# Patient Record
Sex: FEMALE | Race: WHITE | NOT HISPANIC OR LATINO | Employment: FULL TIME | URBAN - METROPOLITAN AREA
[De-identification: names, ages, dates, MRNs, and addresses within clinical notes are randomized per-mention and may not be internally consistent; named-entity substitution may affect disease eponyms.]

---

## 2023-07-27 LAB
ABO GROUP (TYPE) IN BLOOD: NORMAL
ANTIBODY SCREEN: NORMAL
ERYTHROCYTE DISTRIBUTION WIDTH (RATIO) BY AUTOMATED COUNT: 11.7 % (ref 11.5–14.5)
ERYTHROCYTE MEAN CORPUSCULAR HEMOGLOBIN CONCENTRATION (G/DL) BY AUTOMATED: 34.1 G/DL (ref 32–36)
ERYTHROCYTE MEAN CORPUSCULAR VOLUME (FL) BY AUTOMATED COUNT: 91 FL (ref 80–100)
ERYTHROCYTES (10*6/UL) IN BLOOD BY AUTOMATED COUNT: 4.11 X10E12/L (ref 4–5.2)
HEMATOCRIT (%) IN BLOOD BY AUTOMATED COUNT: 37.5 % (ref 36–46)
HEMOGLOBIN (G/DL) IN BLOOD: 12.8 G/DL (ref 12–16)
HEPATITIS B VIRUS SURFACE AG PRESENCE IN SERUM: NONREACTIVE
HEPATITIS C VIRUS AB PRESENCE IN SERUM: NONREACTIVE
HIV 1/ 2 AG/AB SCREEN: NONREACTIVE
LEUKOCYTES (10*3/UL) IN BLOOD BY AUTOMATED COUNT: 8.2 X10E9/L (ref 4.4–11.3)
NRBC (PER 100 WBCS) BY AUTOMATED COUNT: 0 /100 WBC (ref 0–0)
PLATELETS (10*3/UL) IN BLOOD AUTOMATED COUNT: 199 X10E9/L (ref 150–450)
REFLEX ADDED, ANEMIA PANEL: NORMAL
RH FACTOR: NORMAL
RUBELLA VIRUS IGG AB: POSITIVE
SYPHILIS TOTAL AB: NONREACTIVE

## 2023-08-07 ENCOUNTER — TELEPHONE (OUTPATIENT)
Dept: PRIMARY CARE | Facility: CLINIC | Age: 33
End: 2023-08-07
Payer: COMMERCIAL

## 2023-08-17 DIAGNOSIS — T78.40XA ALLERGY, INITIAL ENCOUNTER: ICD-10-CM

## 2023-08-19 LAB
CHLAMYDIA TRACH., AMPLIFIED: NEGATIVE
N. GONORRHEA, AMPLIFIED: NEGATIVE
URINE CULTURE: NORMAL

## 2023-09-29 VITALS — DIASTOLIC BLOOD PRESSURE: 60 MMHG | BODY MASS INDEX: 23.82 KG/M2 | SYSTOLIC BLOOD PRESSURE: 106 MMHG | WEIGHT: 159 LBS

## 2023-09-29 PROBLEM — R00.2 PALPITATIONS: Status: ACTIVE | Noted: 2023-09-29

## 2023-09-29 PROBLEM — N89.8 VAGINAL ODOR: Status: ACTIVE | Noted: 2023-09-29

## 2023-09-29 PROBLEM — B35.1 ONYCHOMYCOSIS OF TOENAIL: Status: ACTIVE | Noted: 2023-09-29

## 2023-09-29 PROBLEM — R92.8 ABNORMAL MAMMOGRAM: Status: ACTIVE | Noted: 2023-09-29

## 2023-09-29 PROBLEM — R42 DIZZINESS: Status: ACTIVE | Noted: 2023-09-29

## 2023-09-29 RX ORDER — EPINEPHRINE 0.3 MG/.3ML
INJECTION SUBCUTANEOUS
COMMUNITY
Start: 2022-09-08

## 2023-09-29 RX ORDER — CALCIUM CARBONATE/VITAMIN D3 500 MG-10
1 TABLET,CHEWABLE ORAL DAILY
COMMUNITY

## 2023-10-10 NOTE — PROGRESS NOTES
Rosemary Tellez female   1990 33 y.o.   [id number]      Chief Complaint  High risk surveillance care, annual mammogram and exam    History Of Present Illness  Rosemary Tellez is a very pleasant 33 year old  female [facility] care. She has family history of breast cancer in her mother, age 58, BRCA negative and three maternal aunts, ages 43, 64, BRCA negative and 62. She denies breast surgery or biopsy. She presents today for annual mammogram and exam. She denies any new masses or lumps. She is currently 20 weeks pregnant with a baby boy. She is due 3/4/2023.     BREAST IMAGING: 3/1/2023 Bilateral Fast MRI, indicates BI-RADS Category 1. 10/10/2022 Bilateral screening mammogram, indicates BI-RADS Category 1.     FEMALE HISTORY: menarche age 10, , first birth age 28,  x 19 months, OCP's x 12 years, premenopausal, 20 weeks pregnant, extremely dense tissue    FAMILY CANCER HISTORY:  Mother: Breast cancer, 50s, BRCA negative  Maternal Aunt (1): Breast cancer, 43  Maternal Aunt (2): Breast cancer, 64, BRCA negative  Maternal Aunt (3): Breast cancer, 62  Paternal Uncle: Melanoma, 30s  Paternal Great Grandmother: Pancreatic cancer, 80s      Surgical History  She has a past surgical history that includes Mouth surgery (2014) and Other surgical history (2017).     Social History  She reports that she has never smoked. She has never used smokeless tobacco. She reports that she does not currently use alcohol. She reports that she does not use drugs.    Family History  Family History   Problem Relation Name Age of Onset    Other (BICUSPID AORTIC VALVE) Mother      Breast cancer Mother      Other (THORACIC AORTIC ANEURYSM) Mother      Heart attack Father      Breast cancer Mother's Sister      Osteoporosis Maternal Grandmother      Diabetes Paternal Grandfather      Heart attack Paternal Grandfather          Allergies  Patient has no known  allergies.    Medications  Current Outpatient Medications   Medication Instructions    calcium carbonate-vitamin D3 (Calcium 500 + D) 500 mg-10 mcg (400 unit) chewable tablet 1 tablet, oral, Daily    EPINEPHrine 0.3 mg/0.3 mL injection syringe EPINEPHrine 0.3 MG/0.3ML Injection Solution Auto-injector   Quantity: 2  Refills: 0        Start : 8-Sep-2022   Active    prenatal no.139-iron-folic-dha 33 mg iron- 800 mcg-350 mg combo pack oral         REVIEW OF SYSTEMS    Constitutional:  Negative for appetite change, fatigue, fever and unexpected weight change.   HENT:  Negative for ear pain, hearing loss, nosebleeds, sore throat and trouble swallowing.    Eyes:  Negative for discharge, itching and visual disturbance.   Respiratory:  Negative for cough, chest tightness and shortness of breath.    Cardiovascular:  Negative for chest pain, palpitations and leg swelling.   Breast: as indicated in HPI  Gastrointestinal:  Negative for abdominal pain, constipation, diarrhea and nausea.   Endocrine: Negative for cold intolerance and heat intolerance.   Genitourinary:  Negative for dysuria, frequency, hematuria, pelvic pain and vaginal bleeding.   Musculoskeletal:  Negative for arthralgias, back pain, gait problem, joint swelling and myalgias.   Skin:  Negative for color change and rash.   Allergic/Immunologic: Negative for environmental allergies and food allergies.   Neurological:  Negative for dizziness, tremors, speech difficulty, weakness, numbness and headaches.   Hematological:  Does not bruise/bleed easily.   Psychiatric/Behavioral:  Negative for agitation, dysphoric mood and sleep disturbance. The patient is not nervous/anxious.         Past Medical History  She has a past medical history of ASCUS with positive high risk HPV cervical, Migraine, Other conditions influencing health status, Other conditions influencing health status (01/16/2015), and Personal history of other diseases of the nervous system and sense  organs.     Physical Exam  Patient is alert and oriented x3 and in a relaxed and appropriate mood. Her gait is steady and hand grasps are equal. Sclera is clear. The breasts are full and nearly symmetrical. The tissue is very dense without palpable abnormalities, discrete nodules or masses. The skin and nipples appear normal. There is no cervical, supraclavicular or axillary lymphadenopathy. I am able to palpate a right axillary lymph node, soft, smooth and mobile. Heart rate and rhythm normal, S1 and S2 appreciated. The lungs are clear to auscultation bilaterally. Abdomen is soft and non-tender.       Physical Exam     Last Recorded Vitals  There were no vitals filed for this visit.    Relevant Results   Time was spent discussing digital images of the radiology testing with the patient. I explained the results in depth, along with suggested explanation for follow up recommendations based on the testing results. BI-RADS Category 1    Imaging  MR breast bilateral w IV contrast fast screening self pay     Narrative  Interpreted By:  SLADE PYLE MD  MRN: [id number]  Patient Name: DAMARIS CARDOSO    STUDY:  MRI BREAST BILATERAL WITH CONTRAST FAST SCREENING (SELF PAY);  3/1/2023 7:49 am    ACCESSION NUMBER(S):  [id number]    ORDERING CLINICIAN:  MARY SWANN    INDICATION:  Dense breast tissue on mammography. Patient has a family history of  breast cancer in her mother in her 50s and maternal aunt in her 60s.    COMPARISON:  04/10/2022, mammogram from 10/10/2022    TECHNIQUE:  Using a dedicated breast coil, STIR axial and T1-weighted fat  saturation axial images of the breasts were obtained, the latter both  before and after intravenous administration of Gadolinium DTPA.    Intravenous contrast:  14 milliliter of DOTAREM GADOTERATE MEGLUMINE  INJECTION    FINDINGS:  There is symmetric minimal bilateral background enhancement.    There is extreme fibroglandular tissue.    RIGHT BREAST:  No suspicious mass or nonmass  enhancement is  identified.    No axillary or internal mammary lymphadenopathy is appreciated.    LEFT BREAST:  No suspicious mass or nonmass enhancement is identified.    No axillary or internal mammary lymphadenopathy is appreciated.    NON-BREAST FINDINGS:  None.    Impression  No MRI evidence of malignancy in either breast.    BI-RADS CATEGORY:    Category: 1 - Negative.  Recommendation:  1 Year Screening.       Assessment/Plan   High risk surveillance care, normal clinical exam, family history of breast cancer, family history of BRCA negative, extremely dense tissue    Plan: Discussed delaying mammogram until after delivery versus mammogram today. Reassured patient mammogram is safe during pregnancy. Patient would like to have OB ultrasound today as scheduled and return for screening mammogram to follow. Will call if results are abnormal. She is aware to pump and nurse both breasts prior to annual mammogram next year.     Patient Discussion/Summary  Your clinical examination is normal. Please return in one year for bilateral screening mammogram and office visit or sooner if you have any problems or concerns.     High risk breast surveillance care plan:  Yearly mammogram with digital breast tomosynthesis  Twice yearly clinical breast examinations  Breast MRI - not recommended at this time due to pregnancy  Monthly self breast examinations  Vitamin D3 2000 IU/daily (over the counter)  Exercise 3-4 times per week for 45-60 minutes  Limit alcohol to 3-4 drinks per week   Eat a healthy low-fat diet with lots of fruits and vegetables  Risk models indicate personal risk of breast cancer in the next five years and lifetime (age 90)  Breast Cancer Risk Assessment Tool (Starla): 5 year risk 0.6% (average 0.3%) and lifetime risk 19.7% (average 12.6%)  Hermelindo: 5 year risk 1.1% (average 0.3%) and lifetime risk 32.1% (average 11.1%)     You can see your health information, review clinical summaries from office visits &  test results online when you follow your health with MY  Chart, a personal health record. To sign up go to [email or web address]. If you need assistance with signing up or trouble getting into your account call [facility] Patient Line 24/7 at [phone].    My office phone number is [phone] if you need to get in touch with me or have additional questions or concerns. Thank you for choosing [facility] and trusting me as your healthcare provider. I look forward to seeing you again at your next office visit. I am honored to be a provider on your health care team and I remain dedicated to helping you achieve your health goals.      Joann Hebert, APRN-CNP

## 2023-10-16 ENCOUNTER — ANCILLARY PROCEDURE (OUTPATIENT)
Dept: RADIOLOGY | Facility: CLINIC | Age: 33
End: 2023-10-16
Payer: COMMERCIAL

## 2023-10-16 ENCOUNTER — APPOINTMENT (OUTPATIENT)
Dept: RADIOLOGY | Facility: CLINIC | Age: 33
End: 2023-10-16
Payer: COMMERCIAL

## 2023-10-16 ENCOUNTER — OFFICE VISIT (OUTPATIENT)
Dept: SURGICAL ONCOLOGY | Facility: CLINIC | Age: 33
End: 2023-10-16
Payer: COMMERCIAL

## 2023-10-16 VITALS
BODY MASS INDEX: 25.21 KG/M2 | OXYGEN SATURATION: 99 % | HEART RATE: 77 BPM | WEIGHT: 167.11 LBS | SYSTOLIC BLOOD PRESSURE: 106 MMHG | DIASTOLIC BLOOD PRESSURE: 71 MMHG | RESPIRATION RATE: 18 BRPM | TEMPERATURE: 97.5 F

## 2023-10-16 VITALS — WEIGHT: 158.95 LBS | BODY MASS INDEX: 24.09 KG/M2 | HEIGHT: 68 IN

## 2023-10-16 DIAGNOSIS — Z12.39 ENCOUNTER FOR OTHER SCREENING FOR MALIGNANT NEOPLASM OF BREAST: ICD-10-CM

## 2023-10-16 DIAGNOSIS — R92.30 DENSE BREAST TISSUE: ICD-10-CM

## 2023-10-16 DIAGNOSIS — Z12.39 BREAST CANCER SCREENING, HIGH RISK PATIENT: Primary | ICD-10-CM

## 2023-10-16 DIAGNOSIS — Z3A.19 19 WEEKS GESTATION OF PREGNANCY (HHS-HCC): ICD-10-CM

## 2023-10-16 DIAGNOSIS — O35.EXX0 FETAL RENAL ANOMALY, SINGLE GESTATION (HHS-HCC): Primary | ICD-10-CM

## 2023-10-16 PROCEDURE — 77067 SCR MAMMO BI INCL CAD: CPT | Mod: BILATERAL PROCEDURE | Performed by: STUDENT IN AN ORGANIZED HEALTH CARE EDUCATION/TRAINING PROGRAM

## 2023-10-16 PROCEDURE — 99214 OFFICE O/P EST MOD 30 MIN: CPT | Performed by: NURSE PRACTITIONER

## 2023-10-16 PROCEDURE — 77063 BREAST TOMOSYNTHESIS BI: CPT | Mod: BILATERAL PROCEDURE | Performed by: STUDENT IN AN ORGANIZED HEALTH CARE EDUCATION/TRAINING PROGRAM

## 2023-10-16 PROCEDURE — 77067 SCR MAMMO BI INCL CAD: CPT | Mod: 50

## 2023-10-16 PROCEDURE — 76811 OB US DETAILED SNGL FETUS: CPT | Performed by: OBSTETRICS & GYNECOLOGY

## 2023-10-16 PROCEDURE — 1036F TOBACCO NON-USER: CPT | Performed by: NURSE PRACTITIONER

## 2023-10-16 PROCEDURE — 77063 BREAST TOMOSYNTHESIS BI: CPT | Mod: 50

## 2023-10-16 PROCEDURE — 76811 OB US DETAILED SNGL FETUS: CPT

## 2023-10-16 ASSESSMENT — PAIN SCALES - GENERAL: PAINLEVEL: 0-NO PAIN

## 2023-10-16 ASSESSMENT — ENCOUNTER SYMPTOMS
OCCASIONAL FEELINGS OF UNSTEADINESS: 0
LOSS OF SENSATION IN FEET: 0
DEPRESSION: 0

## 2023-10-16 NOTE — PROGRESS NOTES
Ultrasound findings:  AMA 34 with rr cfDNA.  A targeted anatomic survey was indicated due to the finding of pyelectasis  -Fetal biometry is consistent with the stated gestational age  - Isolated renal pyelectasis is noted with an APRD of 4.4 m bilaterally.  There is no ureter visible, the parenchyma appears normal and there is no caliectasis. No other soft markers for fetal trisomy seen.  -Detailed anatomic evaluation of the fetal brain/ventricles, face, heart/outflow tracts and chest anatomy, abdominal organ specific anatomy, number/length/architecture of limbs and detailed evaluation of the umbilical cord and placenta and other fetal anatomy as clinically indicated was performed.   -No malformations were identified on this comprehensive survey within limitations of sonographic evaluation at this gestational age.  Isolated pyelectasis increases the risk for Trisomy 21 by approximately 2.8 fold.  Correlation with previous screening or maternal age revises risk to 3751. Genetic counseling is available if desired. The finding of renal pyelectasis also increases the risk for urinary reflux and and obstructive uropathy. A single follow up study at 34-36 weeks is recommended for renal evaluation. If pyelectasis persists or worsens,  evaluation is warranted.  An otherwise normal sonogram cannot exclude all structural and functional deficits.  The patient was informed of the above finding and all questions addressed. (see consultation note)  Thank you for the referral    Counseling:  Counseling was performed due to bilateral pyelectasis on the anatomic survey today.  There were no other abnormalities.  The following counseling was given:   - Measurements show a mild increase In the size if the renal pelvis: rt- 4.4mm, bilateral.  The tissue of the kidney appears normal.  Ureters were not seen. There e is no caliectasis. No other malformations were noted  - The anatomy, components and function of the components  were reviewed. and ultrasound findings explained.  - Mild hydronephrosis, or pyelectasis, is a common finding seen during ultrasound evaluation.  It referes to mild dilation of the fetal renal pelvis. It has been estimated that about 2-3% of fetuses studied by ultrasound have mild dilation of the renal pelvis.    -This finding is also more common in males than in females. This is a male fetus and the patient knows the sex.  - Extra fluid in the renal pelves can be caused from: maternal hormones, obstruction and reflux.  These abnormalities were explained.  - Kidneys can also have pyelectasis and function totally normal.  Function cannot be determined by an ultrasound evaluation.  - For the majority of cases (90%) with mild hydronephrosis the long-term prognosis is excellent with no need for any surgical intervention.  - One  follow-up ultrasound study at 34-36 weeks gestation is recommended. It should be performed at that time even if early third trimester scans are normal as it correlates better with outcome.  - If the kidneys appear normal, no follow up is needed. If pyelectasis is still present,  evaluation is recommended. Typically, there is a direct correlation of the degree of renal pelvis dilation and subsequent uropathy and the need for surgical intervention. The measurements in this case are mildly elevated and not expected to be clinically significant.   -The association of mild dilation of the renal pelves and Down syndrome was then discussed.  She was informed that Down syndrome is usually a sporadic event related to increasing maternal age which happens at the time of conception.  She had a rr cfDNA with a residual risk of 1/10,000.  This finding increases the risk to 3751.  The parents were informed.    The patient expressed understanding of the above. She was told to schedule a repeat evaluaton at 34 weeks.

## 2023-10-16 NOTE — PATIENT INSTRUCTIONS
Your clinical examination is normal. Per our discussion, please proceed to your OB ultrasound today as scheduled. Pending normal scan, please return for bilateral screening mammogram. I will call you if the results are abnormal.     You can see your health information, review clinical summaries from office visits & test results online when you follow your health with MY  Chart, a personal health record. To sign up go to [email or web address]. If you need assistance with signing up or trouble getting into your account call [facility] Patient Line 24/7 at [phone].    My office phone number is [phone] if you need to get in touch with me or have additional questions or concerns. Thank you for choosing [facility] and trusting me as your healthcare provider. I look forward to seeing you again at your next office visit. I am honored to be a provider on your health care team and I remain dedicated to helping you achieve your health goals.

## 2023-10-20 ENCOUNTER — ROUTINE PRENATAL (OUTPATIENT)
Dept: OBSTETRICS AND GYNECOLOGY | Facility: CLINIC | Age: 33
End: 2023-10-20
Payer: COMMERCIAL

## 2023-10-20 VITALS — DIASTOLIC BLOOD PRESSURE: 68 MMHG | WEIGHT: 168 LBS | BODY MASS INDEX: 25.34 KG/M2 | SYSTOLIC BLOOD PRESSURE: 112 MMHG

## 2023-10-20 DIAGNOSIS — Z3A.20 20 WEEKS GESTATION OF PREGNANCY (HHS-HCC): Primary | ICD-10-CM

## 2023-10-20 LAB
POC BLOOD, URINE: NEGATIVE
POC GLUCOSE, URINE: NEGATIVE MG/DL
POC LEUKOCYTES, URINE: NEGATIVE
POC NITRITE,URINE: NEGATIVE
POC PROTEIN, URINE: NEGATIVE MG/DL

## 2023-10-20 PROCEDURE — 0501F PRENATAL FLOW SHEET: CPT | Performed by: OBSTETRICS & GYNECOLOGY

## 2023-10-20 PROCEDURE — 81003 URINALYSIS AUTO W/O SCOPE: CPT | Performed by: OBSTETRICS & GYNECOLOGY

## 2023-10-20 NOTE — PROGRESS NOTES
"Subjective   Patient ID [id number]   Rosemary Tellez is a 33 y.o.  at 20w4d with a working estimated date of delivery of 3/4/2024, by Last Menstrual Period who presents for a routine prenatal visit.   She denies vaginal bleeding, leakage of fluid, decreased fetal movements, or contractions.  Now feeling the baby move regularly.   Has questions about Covid vaccine. Already had flu vaccine.  Also questions about anatomy ultrasound done on Monday that showed pyelectasis.    Objective   Physical Exam  Weight: 76.2 kg (168 lb)  Expected Total Weight Gain: 11.5 kg (25 lb)-16 kg (35 lb)   Pregravid BMI: 23.99  BP: 112/68  Fetal Heart Rate: 152 Fundal Height (cm): 20 cm    Prenatal Labs  Urine dip:  No results found for: \"KETONESU\", \"GLUCOSEUR\", \"LEUKOCYTESUR\"       Assessment/Plan   Diagnoses and all orders for this visit:  20 weeks gestation of pregnancy  Continue prenatal vitamin.  Ultrasound reviewed. Explained finding of pyelectasis and gave reassurances. She has a follow up scheduled for 34 weeks.  Discussed that she can and should get the Covid vaccine booster at any time.  Follow up in 4 weeks for a routine prenatal visit.  "

## 2023-11-17 ENCOUNTER — ROUTINE PRENATAL (OUTPATIENT)
Dept: OBSTETRICS AND GYNECOLOGY | Facility: CLINIC | Age: 33
End: 2023-11-17
Payer: COMMERCIAL

## 2023-11-17 VITALS — WEIGHT: 170 LBS | SYSTOLIC BLOOD PRESSURE: 102 MMHG | BODY MASS INDEX: 25.65 KG/M2 | DIASTOLIC BLOOD PRESSURE: 70 MMHG

## 2023-11-17 DIAGNOSIS — Z34.82 ENCOUNTER FOR SUPERVISION OF OTHER NORMAL PREGNANCY IN SECOND TRIMESTER (HHS-HCC): Primary | ICD-10-CM

## 2023-11-17 DIAGNOSIS — Z3A.24 24 WEEKS GESTATION OF PREGNANCY (HHS-HCC): ICD-10-CM

## 2023-11-17 LAB
POC BLOOD, URINE: NEGATIVE
POC GLUCOSE, URINE: NEGATIVE MG/DL
POC KETONES, URINE: NEGATIVE MG/DL
POC LEUKOCYTES, URINE: ABNORMAL
POC NITRITE,URINE: NEGATIVE
POC PROTEIN, URINE: NEGATIVE MG/DL

## 2023-11-17 PROCEDURE — 0501F PRENATAL FLOW SHEET: CPT | Performed by: OBSTETRICS & GYNECOLOGY

## 2023-11-17 PROCEDURE — 81003 URINALYSIS AUTO W/O SCOPE: CPT | Performed by: OBSTETRICS & GYNECOLOGY

## 2023-11-17 NOTE — PROGRESS NOTES
Subjective   Rosemary Tellez is a 33 y.o.  at 24w4d, presents for a routine prenatal visit.   Feels well. Good fetal movement.     Objective     /70   Wt 77.1 kg (170 lb)   LMP 2023   BMI 25.65 kg/m²     Plan  24 weeks  Recd flu vaccine 10/13/2023  Recd Covid boostervaccine 2023  RTO one month  Glucose and CBC requisitions given

## 2023-11-24 ENCOUNTER — LAB (OUTPATIENT)
Dept: LAB | Facility: LAB | Age: 33
End: 2023-11-24
Payer: COMMERCIAL

## 2023-11-24 DIAGNOSIS — Z3A.24 24 WEEKS GESTATION OF PREGNANCY (HHS-HCC): ICD-10-CM

## 2023-11-24 DIAGNOSIS — R73.09 GLUCOSE TOLERANCE TEST ABNORMAL: ICD-10-CM

## 2023-11-24 LAB
ERYTHROCYTE [DISTWIDTH] IN BLOOD BY AUTOMATED COUNT: 12 % (ref 11.5–14.5)
GLUCOSE 1H P 50 G GLC PO SERPL-MCNC: 157 MG/DL
HCT VFR BLD AUTO: 35.5 % (ref 36–46)
HGB BLD-MCNC: 11.6 G/DL (ref 12–16)
MCH RBC QN AUTO: 30.9 PG (ref 26–34)
MCHC RBC AUTO-ENTMCNC: 32.7 G/DL (ref 32–36)
MCV RBC AUTO: 95 FL (ref 80–100)
NRBC BLD-RTO: 0 /100 WBCS (ref 0–0)
PLATELET # BLD AUTO: 216 X10*3/UL (ref 150–450)
RBC # BLD AUTO: 3.75 X10*6/UL (ref 4–5.2)
WBC # BLD AUTO: 9.2 X10*3/UL (ref 4.4–11.3)

## 2023-11-24 PROCEDURE — 82947 ASSAY GLUCOSE BLOOD QUANT: CPT

## 2023-11-24 PROCEDURE — 36415 COLL VENOUS BLD VENIPUNCTURE: CPT

## 2023-11-24 PROCEDURE — 85027 COMPLETE CBC AUTOMATED: CPT

## 2023-11-25 LAB — REFLEX ADDED, ANEMIA PANEL: NORMAL

## 2023-11-27 ENCOUNTER — LAB (OUTPATIENT)
Dept: LAB | Facility: LAB | Age: 33
End: 2023-11-27
Payer: COMMERCIAL

## 2023-11-27 DIAGNOSIS — R73.09 GLUCOSE TOLERANCE TEST ABNORMAL: ICD-10-CM

## 2023-11-27 LAB
GLUCOSE 1H P 100 G GLC PO SERPL-MCNC: 147 MG/DL
GLUCOSE 2H P 100 G GLC PO SERPL-MCNC: 121 MG/DL
GLUCOSE 3H P 100 G GLC PO SERPL-MCNC: 104 MG/DL
GLUCOSE P FAST SERPL-MCNC: 72 MG/DL

## 2023-11-27 PROCEDURE — 82952 GTT-ADDED SAMPLES: CPT

## 2023-11-27 PROCEDURE — 82951 GLUCOSE TOLERANCE TEST (GTT): CPT

## 2023-11-27 PROCEDURE — 36415 COLL VENOUS BLD VENIPUNCTURE: CPT

## 2023-11-27 PROCEDURE — 82950 GLUCOSE TEST: CPT

## 2023-12-13 NOTE — PROGRESS NOTES
Subjective   Patient ID [id number]   Rosemary Tellez is a 33 y.o.  at 28w4d     Objective   Physical Exam               Lab Results   Component Value Date    HGB 11.6 (L) 2023    HCT 35.5 (L) 2023       Assessment/Plan     Follow up in 4 weeks for a routine prenatal visit.

## 2023-12-15 ENCOUNTER — APPOINTMENT (OUTPATIENT)
Dept: OBSTETRICS AND GYNECOLOGY | Facility: CLINIC | Age: 33
End: 2023-12-15
Payer: COMMERCIAL

## 2023-12-15 ENCOUNTER — ROUTINE PRENATAL (OUTPATIENT)
Dept: OBSTETRICS AND GYNECOLOGY | Facility: CLINIC | Age: 33
End: 2023-12-15
Payer: COMMERCIAL

## 2023-12-15 VITALS — WEIGHT: 178 LBS | DIASTOLIC BLOOD PRESSURE: 60 MMHG | SYSTOLIC BLOOD PRESSURE: 110 MMHG | BODY MASS INDEX: 26.85 KG/M2

## 2023-12-15 DIAGNOSIS — Z3A.28 28 WEEKS GESTATION OF PREGNANCY (HHS-HCC): ICD-10-CM

## 2023-12-15 LAB
POC BLOOD, URINE: NEGATIVE
POC GLUCOSE, URINE: NEGATIVE MG/DL
POC KETONES, URINE: NEGATIVE MG/DL
POC LEUKOCYTES, URINE: NEGATIVE
POC NITRITE,URINE: NEGATIVE
POC PROTEIN, URINE: NEGATIVE MG/DL

## 2023-12-15 PROCEDURE — 0501F PRENATAL FLOW SHEET: CPT | Performed by: OBSTETRICS & GYNECOLOGY

## 2023-12-15 PROCEDURE — 90471 IMMUNIZATION ADMIN: CPT | Performed by: OBSTETRICS & GYNECOLOGY

## 2023-12-15 PROCEDURE — 59025 FETAL NON-STRESS TEST: CPT | Performed by: OBSTETRICS & GYNECOLOGY

## 2023-12-15 PROCEDURE — 90715 TDAP VACCINE 7 YRS/> IM: CPT | Performed by: OBSTETRICS & GYNECOLOGY

## 2023-12-15 PROCEDURE — 81003 URINALYSIS AUTO W/O SCOPE: CPT | Performed by: OBSTETRICS & GYNECOLOGY

## 2023-12-15 NOTE — PROGRESS NOTES
5Subjective   Patient ID [id number]   Rosemary Tellez is a 33 y.o.  at 28w4d ,+FM, no ctxs/LOF/VB. Movements feel different, mostly at night. Patient is concerned because it feels different than her other kids.    Objective   Physical Exam  Weight: 80.7 kg (178 lb)  BP: 110/60  Fetal Heart Rate: 150 Fundal Height (cm): 29 cm  Reactive NST at 28 weeks.    Lab Results   Component Value Date    HGB 11.6 (L) 2023    HCT 35.5 (L) 2023       Assessment/Plan   NST done.  TDAP today. Handout on RSV given.  Follow up in 4 weeks for a routine prenatal visit.

## 2023-12-15 NOTE — PATIENT INSTRUCTIONS
You were seen in the office today for routine OB care.  Continue routine OB precautions at home  Continue taking prenatal vitamins   Avoid sick contacts and consider getting your Flu (available in office during flu season) and COVID vaccines to protect against infection in pregnancy  We recommend getting the Tdap (available in office) and RSV vaccines to pass some immunity from mother to baby and protect your baby against RSV and Whooping cough in the first few months of life. Tdap can be given between 27 and 36 weeks, and RSV vaccinations can be given between 32 and 36 weeks gestation  Make an appointment for routine care in the office in the next 2 weeks  If you are having any painful contractions, vaginal bleeding, leaking amniotic fluid, or decrease in baby's movements prior to your next visit please call the office to speak to the physician on call. This includes after hours, weekends, and holidays, when the answering service will be able to connect you with the physician on call. [phone] ([place] Office) or [phone] ([place] Office).     RSV Vaccination in Pregnancy:  ACOG recommends the Pfizer RSV vaccine if you are 32 to 36 weeks pregnant from September to January. The vaccines creates  antibodies (immune proteins) that pass from you to your fetus. This means the baby will have some antibodies to protect them from RSV for the first 6 months after birth  RSV, or respiratory syncytial virus is a virus that spreads in the fall and winter. RSV can be dangerous for babies and young children. It is the leading cause of hospitalization among infants in the [place].  There are multiple RSV vaccines approved by the U.S. Food and Drug Administration (FDA). The only RSV vaccine approved by the FDA for use in pregnancy is the one made by Pfizer. It is called Abrysvo.  You can get the Pfizer RSV vaccine at the same time as other vaccines recommended in pregnancy (COVID, Flu, Tdap). Common side effects  of the RSV vaccine include arm pain, headache, muscle pain, and nausea, similar to other vaccines side effects. Side effects are normal and not a cause for concern.   The RSV vaccine is one of two options for protecting babies during RSV season. There is also an option to give babies an injection called nirsevimab. Nirsevimab contains lab-made antibodies that protect against RSV. It is not a vaccine.   In most cases, you should choose between the RSV vaccine during pregnancy nirsevimab after birth (not both). The goal is to protect your baby from RSV, either with antibodies made during pregnancy or with antibodies given directly to your baby after birth.  Benefits to getting the RSV vaccine during pregnancy include: RSV vaccine in pregnancy gives your baby protection right after birth, decreasing the number of injections given to your baby after birth, potential difficulty getting nirsevimab in he fall/winter due to less availability during busy vaccination seasons. Benefits to nirsevimab include possible longer lasting protection.   We fully support the recommendations by ACOG regarding the RSV vaccine in pregnancy. We are unable to offer this vaccine in the office at this time. You should be able to get this vaccine through your primary care physician, pharmacies, and [facility] clinics. Please make sure you are receiving the Pfizer vaccine, as it is the only version to be FDA approved for use in pregnancy. Please let us know if you have had this vaccine so we can include it in your chart.

## 2024-01-05 ENCOUNTER — ROUTINE PRENATAL (OUTPATIENT)
Dept: OBSTETRICS AND GYNECOLOGY | Facility: CLINIC | Age: 34
End: 2024-01-05
Payer: COMMERCIAL

## 2024-01-05 VITALS — DIASTOLIC BLOOD PRESSURE: 70 MMHG | BODY MASS INDEX: 27.46 KG/M2 | SYSTOLIC BLOOD PRESSURE: 112 MMHG | WEIGHT: 182 LBS

## 2024-01-05 DIAGNOSIS — Z34.83 PRENATAL CARE, SUBSEQUENT PREGNANCY, THIRD TRIMESTER (HHS-HCC): Primary | ICD-10-CM

## 2024-01-05 DIAGNOSIS — Z3A.31 31 WEEKS GESTATION OF PREGNANCY (HHS-HCC): ICD-10-CM

## 2024-01-05 PROCEDURE — 0501F PRENATAL FLOW SHEET: CPT | Performed by: OBSTETRICS & GYNECOLOGY

## 2024-01-05 NOTE — PROGRESS NOTES
Subjective   Rosemary Tellez is a 33 y.o.  at 31w4d, presents for a routine prenatal visit.   Feels like pregnancy carrying low. Occ suki danielson.    Objective     /70   Wt 82.6 kg (182 lb)   LMP 2023   BMI 27.46 kg/m²     Plan  31 weeks  Plans RSV vaccine next 2 weeks.    Marissa Baez MD

## 2024-01-19 ENCOUNTER — ROUTINE PRENATAL (OUTPATIENT)
Dept: OBSTETRICS AND GYNECOLOGY | Facility: CLINIC | Age: 34
End: 2024-01-19
Payer: COMMERCIAL

## 2024-01-19 VITALS — BODY MASS INDEX: 27.76 KG/M2 | SYSTOLIC BLOOD PRESSURE: 114 MMHG | DIASTOLIC BLOOD PRESSURE: 62 MMHG | WEIGHT: 184 LBS

## 2024-01-19 DIAGNOSIS — Z34.83 PRENATAL CARE, SUBSEQUENT PREGNANCY, THIRD TRIMESTER (HHS-HCC): Primary | ICD-10-CM

## 2024-01-19 PROCEDURE — 0501F PRENATAL FLOW SHEET: CPT | Performed by: OBSTETRICS & GYNECOLOGY

## 2024-01-19 NOTE — PROGRESS NOTES
Subjective   Rosemary Tellez is a 33 y.o.  at 33w4d, presents for a routine prenatal visit.   Feels well      Objective     /62   Wt 83.5 kg (184 lb)   LMP 2023   BMI 27.76 kg/m²     Plan  33 2/7 wks  Has upcoming ultrasound. Followup bilateral pyelectasis.   RTO 2 weeks.    Marissa Baez MD

## 2024-01-22 ENCOUNTER — TELEPHONE (OUTPATIENT)
Dept: OBSTETRICS AND GYNECOLOGY | Facility: CLINIC | Age: 34
End: 2024-01-22
Payer: COMMERCIAL

## 2024-01-22 DIAGNOSIS — O98.513 COVID-19 AFFECTING PREGNANCY IN THIRD TRIMESTER (HHS-HCC): Primary | ICD-10-CM

## 2024-01-22 DIAGNOSIS — U07.1 COVID-19 AFFECTING PREGNANCY IN THIRD TRIMESTER (HHS-HCC): Primary | ICD-10-CM

## 2024-01-22 RX ORDER — NIRMATRELVIR AND RITONAVIR 300-100 MG
3 KIT ORAL 2 TIMES DAILY
Qty: 30 TABLET | Refills: 0 | Status: SHIPPED | OUTPATIENT
Start: 2024-01-22 | End: 2024-01-27

## 2024-01-22 NOTE — TELEPHONE ENCOUNTER
If there is good FM, no contractions, etc. she should see PCP or urgent care. Will need Covid, flu, etc. testing.

## 2024-01-22 NOTE — TELEPHONE ENCOUNTER
Patient called. Symptoms started yesterday and tested positive for Covid today. Will begin Paxlovid. Patient only felt SOB when sleeping and congestion,  was also anxious. Pulse ox now 99%. Also should not go below 95%. Will try Flonase for congestion. Good FM no ctxs, LOF, VB. If symptoms worsen will go to ER. Otherwise, quarantine now and has appointment next week with us.

## 2024-01-24 ENCOUNTER — APPOINTMENT (OUTPATIENT)
Dept: RADIOLOGY | Facility: CLINIC | Age: 34
End: 2024-01-24
Payer: COMMERCIAL

## 2024-01-29 ENCOUNTER — ROUTINE PRENATAL (OUTPATIENT)
Dept: OBSTETRICS AND GYNECOLOGY | Facility: CLINIC | Age: 34
End: 2024-01-29
Payer: COMMERCIAL

## 2024-01-29 VITALS — DIASTOLIC BLOOD PRESSURE: 82 MMHG | BODY MASS INDEX: 27.91 KG/M2 | WEIGHT: 185 LBS | SYSTOLIC BLOOD PRESSURE: 124 MMHG

## 2024-01-29 DIAGNOSIS — Z34.83 MULTIGRAVIDA IN THIRD TRIMESTER (HHS-HCC): Primary | ICD-10-CM

## 2024-01-29 DIAGNOSIS — Z3A.35 35 WEEKS GESTATION OF PREGNANCY (HHS-HCC): ICD-10-CM

## 2024-01-29 LAB
POC GLUCOSE, URINE: NEGATIVE MG/DL
POC PROTEIN, URINE: ABNORMAL MG/DL

## 2024-01-29 PROCEDURE — 0501F PRENATAL FLOW SHEET: CPT | Performed by: OBSTETRICS & GYNECOLOGY

## 2024-01-29 NOTE — PROGRESS NOTES
Subjective   Patient ID [id number]   Rosemary Tellez is a 33 y.o.  at 35w0d with a working estimated date of delivery of 3/4/2024, by Last Menstrual Period who presents for a routine prenatal visit. She denies vaginal bleeding, leakage of fluid, decreased fetal movements, or contractions.    Her pregnancy is complicated by:  COVID  AMA      Objective   Physical Exam:   Weight: 83.9 kg (185 lb)  Expected Total Weight Gain: 11.5 kg (25 lb)-16 kg (35 lb)   Pregravid BMI: 23.99  BP: 124/82                  Prenatal Labs  Urine Dip:  Lab Results   Component Value Date    KETONESU NEGATIVE 2024     Lab Results   Component Value Date    HGB 11.6 (L) 2023    HCT 35.5 (L) 2023    ABO A 2023    HEPBSAG NONREACTIVE 2023           Imaging: growth US tomorrow.    Assessment/Plan   Problem List Items Addressed This Visit    None  Visit Diagnoses         Codes    Multigravida in third trimester    -  Primary Z34.83    Patient doing well  US/GBS/consent next   Precautions given  RTO 1 week     35 weeks gestation of pregnancy     Z3A.35    Relevant Orders    POCT UA Automated manually resulted (Completed)          Continue prenatal vitamin.  Labs reviewed.  GBS 36 weeks  Expected mode of delivery TBD  Follow up in 1 week for a routine prenatal visit.  Angelica Wasserman DO

## 2024-01-29 NOTE — PATIENT INSTRUCTIONS
You were seen in the office today for routine OB care and had normal findings on exam  Continue routine OB precautions at home  Continue taking prenatal vitamins   Avoid sick contacts and consider getting your Flu (available in office during flu season) and COVID vaccines to protect against infection in pregnancy  We recommend getting the Tdap (available in office) and RSV vaccines to pass some immunity from mother to baby and protect your baby against RSV and Whooping cough in the first few months of life. Tdap can be given between 27 and 36 weeks, and RSV vaccinations can be given between 32 and 36 weeks gestation  Make an appointment for routine care in the office in the next 2 weeks  If you are having any painful contractions, vaginal bleeding, leaking amniotic fluid, or decrease in baby's movements prior to your next visit please call the office to speak to the physician on call. This includes after hours, weekends, and holidays, when the answering service will be able to connect you with the physician on call. [phone] ([place] Office) or [phone] ([place] Office).

## 2024-01-31 ENCOUNTER — APPOINTMENT (OUTPATIENT)
Dept: OBSTETRICS AND GYNECOLOGY | Facility: CLINIC | Age: 34
End: 2024-01-31
Payer: COMMERCIAL

## 2024-02-01 ENCOUNTER — HOSPITAL ENCOUNTER (OUTPATIENT)
Dept: RADIOLOGY | Facility: CLINIC | Age: 34
Discharge: HOME | End: 2024-02-01
Payer: COMMERCIAL

## 2024-02-01 DIAGNOSIS — Z32.01 PREGNANCY TEST POSITIVE (HHS-HCC): ICD-10-CM

## 2024-02-01 PROCEDURE — 76819 FETAL BIOPHYS PROFIL W/O NST: CPT | Performed by: STUDENT IN AN ORGANIZED HEALTH CARE EDUCATION/TRAINING PROGRAM

## 2024-02-01 PROCEDURE — 76816 OB US FOLLOW-UP PER FETUS: CPT | Performed by: STUDENT IN AN ORGANIZED HEALTH CARE EDUCATION/TRAINING PROGRAM

## 2024-02-01 PROCEDURE — 76819 FETAL BIOPHYS PROFIL W/O NST: CPT

## 2024-02-01 PROCEDURE — 76816 OB US FOLLOW-UP PER FETUS: CPT

## 2024-02-07 ENCOUNTER — OFFICE VISIT (OUTPATIENT)
Dept: OBSTETRICS AND GYNECOLOGY | Facility: CLINIC | Age: 34
End: 2024-02-07
Payer: COMMERCIAL

## 2024-02-07 ENCOUNTER — PREP FOR PROCEDURE (OUTPATIENT)
Dept: OBSTETRICS AND GYNECOLOGY | Facility: HOSPITAL | Age: 34
End: 2024-02-07

## 2024-02-07 VITALS — SYSTOLIC BLOOD PRESSURE: 120 MMHG | DIASTOLIC BLOOD PRESSURE: 70 MMHG | BODY MASS INDEX: 28.51 KG/M2 | WEIGHT: 189 LBS

## 2024-02-07 DIAGNOSIS — Z34.90 TERM PREGNANCY (HHS-HCC): Primary | ICD-10-CM

## 2024-02-07 DIAGNOSIS — Z3A.36 36 WEEKS GESTATION OF PREGNANCY (HHS-HCC): ICD-10-CM

## 2024-02-07 DIAGNOSIS — Z34.83 MULTIGRAVIDA IN THIRD TRIMESTER (HHS-HCC): Primary | ICD-10-CM

## 2024-02-07 LAB
POC GLUCOSE, URINE: NEGATIVE MG/DL
POC PROTEIN, URINE: ABNORMAL MG/DL

## 2024-02-07 PROCEDURE — 0501F PRENATAL FLOW SHEET: CPT | Performed by: OBSTETRICS & GYNECOLOGY

## 2024-02-07 PROCEDURE — 87081 CULTURE SCREEN ONLY: CPT

## 2024-02-07 PROCEDURE — 1036F TOBACCO NON-USER: CPT | Performed by: OBSTETRICS & GYNECOLOGY

## 2024-02-07 RX ORDER — OXYTOCIN 10 [USP'U]/ML
10 INJECTION, SOLUTION INTRAMUSCULAR; INTRAVENOUS ONCE AS NEEDED
Status: CANCELLED | OUTPATIENT
Start: 2024-02-07

## 2024-02-07 RX ORDER — TERBUTALINE SULFATE 1 MG/ML
0.25 INJECTION SUBCUTANEOUS ONCE AS NEEDED
Status: CANCELLED | OUTPATIENT
Start: 2024-02-07

## 2024-02-07 RX ORDER — NIFEDIPINE 10 MG/1
10 CAPSULE ORAL ONCE AS NEEDED
Status: CANCELLED | OUTPATIENT
Start: 2024-02-07

## 2024-02-07 RX ORDER — LIDOCAINE HYDROCHLORIDE 10 MG/ML
30 INJECTION INFILTRATION; PERINEURAL ONCE AS NEEDED
Status: CANCELLED | OUTPATIENT
Start: 2024-02-07

## 2024-02-07 RX ORDER — METHYLERGONOVINE MALEATE 0.2 MG/ML
0.2 INJECTION INTRAVENOUS ONCE AS NEEDED
Status: CANCELLED | OUTPATIENT
Start: 2024-02-07

## 2024-02-07 RX ORDER — CARBOPROST TROMETHAMINE 250 UG/ML
250 INJECTION, SOLUTION INTRAMUSCULAR ONCE AS NEEDED
Status: CANCELLED | OUTPATIENT
Start: 2024-02-07

## 2024-02-07 RX ORDER — LABETALOL HYDROCHLORIDE 5 MG/ML
20 INJECTION, SOLUTION INTRAVENOUS ONCE AS NEEDED
Status: CANCELLED | OUTPATIENT
Start: 2024-02-07

## 2024-02-07 RX ORDER — LOPERAMIDE HYDROCHLORIDE 2 MG/1
4 CAPSULE ORAL EVERY 2 HOUR PRN
Status: CANCELLED | OUTPATIENT
Start: 2024-02-07

## 2024-02-07 RX ORDER — SODIUM CHLORIDE, SODIUM LACTATE, POTASSIUM CHLORIDE, CALCIUM CHLORIDE 600; 310; 30; 20 MG/100ML; MG/100ML; MG/100ML; MG/100ML
125 INJECTION, SOLUTION INTRAVENOUS CONTINUOUS
Status: CANCELLED | OUTPATIENT
Start: 2024-02-07

## 2024-02-07 RX ORDER — TRANEXAMIC ACID 100 MG/ML
1000 INJECTION, SOLUTION INTRAVENOUS ONCE AS NEEDED
Status: CANCELLED | OUTPATIENT
Start: 2024-02-07

## 2024-02-07 RX ORDER — ONDANSETRON 4 MG/1
4 TABLET, FILM COATED ORAL EVERY 6 HOURS PRN
OUTPATIENT
Start: 2024-02-07

## 2024-02-07 RX ORDER — MISOPROSTOL 100 UG/1
800 TABLET ORAL ONCE AS NEEDED
Status: CANCELLED | OUTPATIENT
Start: 2024-02-07

## 2024-02-07 RX ORDER — HYDRALAZINE HYDROCHLORIDE 20 MG/ML
5 INJECTION INTRAMUSCULAR; INTRAVENOUS ONCE AS NEEDED
Status: CANCELLED | OUTPATIENT
Start: 2024-02-07

## 2024-02-07 RX ORDER — METOCLOPRAMIDE HYDROCHLORIDE 5 MG/ML
10 INJECTION INTRAMUSCULAR; INTRAVENOUS EVERY 6 HOURS PRN
Status: CANCELLED | OUTPATIENT
Start: 2024-02-07

## 2024-02-07 RX ORDER — ONDANSETRON HYDROCHLORIDE 2 MG/ML
4 INJECTION, SOLUTION INTRAVENOUS EVERY 6 HOURS PRN
OUTPATIENT
Start: 2024-02-07

## 2024-02-07 RX ORDER — METOCLOPRAMIDE 5 MG/1
10 TABLET ORAL EVERY 6 HOURS PRN
Status: CANCELLED | OUTPATIENT
Start: 2024-02-07

## 2024-02-07 NOTE — PATIENT INSTRUCTIONS
You were seen in the office today for routine OB care and had normal findings on exam  Continue routine OB precautions at home  Continue taking prenatal vitamins   Avoid sick contacts and consider getting your Flu (available in office during flu season) and COVID vaccines to protect against infection in pregnancy  We recommend getting the Tdap (available in office) and RSV vaccines to pass some immunity from mother to baby and protect your baby against RSV and Whooping cough in the first few months of life. Tdap can be given between 27 and 36 weeks, and RSV vaccinations can be given between 32 and 36 weeks gestation  Make an appointment for routine care in the office in the next 2 weeks  If you are having any painful contractions, vaginal bleeding, leaking amniotic fluid, or decrease in baby's movements prior to your next visit please call the office to speak to the physician on call. This includes after hours, weekends, and holidays, when the answering service will be able to connect you with the physician on call. [phone] ([place] [facility]) or [phone] ([place] [facility]).

## 2024-02-07 NOTE — PROGRESS NOTES
Subjective   Patient ID [id number]   Rosemary Tellez is a 33 y.o.  at 36w2d with a working estimated date of delivery of 3/4/2024, by Last Menstrual Period who presents for a routine prenatal visit. She denies vaginal bleeding, leakage of fluid, decreased fetal movements, or contractions.    Her pregnancy is complicated by:  AMA  Isolated right urinary tract dilation    Objective   Physical Exam:   Weight: 85.7 kg (189 lb)  Expected Total Weight Gain: 11.5 kg (25 lb)-16 kg (35 lb)   Pregravid BMI: 23.99  BP: 120/70                  Prenatal Labs  Urine Dip:  Lab Results   Component Value Date    KETONESU NEGATIVE 2024     Lab Results   Component Value Date    HGB 11.6 (L) 2023    HCT 35.5 (L) 2023    ABO A 2023    HEPBSAG NONREACTIVE 2023           Imaging: vertex       Assessment/Plan   Problem List Items Addressed This Visit    None  Visit Diagnoses         Codes    Multigravida in third trimester    -  Primary Z34.83    36 weeks gestation of pregnancy     Z3A.36    Relevant Orders    POCT UA Automated manually resulted (Completed)          Continue prenatal vitamin.  Labs reviewed.  GBS taken.  Expected mode of delivery   Follow up in 1 week for a routine prenatal visit.  Angelica Wasserman,

## 2024-02-07 NOTE — H&P
Obstetrical Admission History and Physical     Rosemary Tellez is a 33 y.o.  at 36w2d. [place]: 3/4/2024, by Last Menstrual Period. Estimated fetal weight: 7 lbs 8 oz. She has had prenatal care with [facility] .    Chief Complaint: No chief complaint on file.    Assessment/Plan    Admit to L&D  IVF, CEFM, minimal PO/clear liquids   CBC, T&S  Cytotec followed by Pitocin per protocol  May have epidural  GBS pending, ABX prophylaxis if appropriate    Active Problems:  There are no active Hospital Problems.      Pregnancy Problems (from 21 to present)       Problem Noted Resolved    28 weeks gestation of pregnancy 12/15/2023 by Darcie Rm MD No          Options for delivery have been discussed with the patient and she elects for an induction of labor.  Cervical ripening with cytotec, cervidil, other prostaglandin agents has been discussed.  Induction of labor with pitocin, amniotomy, cytotec, and cervical balloon have been discussed in detail. The risks, benefits, complications, alternatives, expected outcomes, potential problems during recuperation and recovery, and the risks of not performing the procedure were discussed with the patient. The patient stated understanding that the risks of delivery include, but are not limited to: death; reaction to medications; injury to bowel, bladder, ureters, uterus, cervix, vagina, and other pelvic and abdominal structures, infection; blood loss and possible need for transfusion; and potential need for surgery, including hysterectomy. The risks of injury to the infant during delivery were also discussed. All questions were answered. There was concurrence with the planned procedure, and the patient wanted to proceed.    Admit to inpatient status. I anticipate that this patient will require a stay exceeding at least 2 midnights for delivery and postpartum.  Induction of labor.  Management of pregnancy complications, as indicated.    Subjective   Good fetal movement. Denies  vaginal bleeding., Denies contractions., Denies leaking of fluid.       Reason for Induction of Labor:  Pregnancy at 39 weeks or greater for induction         Obstetrical History   OB History    Para Term  AB Living   3 2 2 0 0 2   SAB IAB Ectopic Multiple Live Births   0 0 0 0 2      # Outcome Date GA Lbr Delmer/2nd Weight Sex Delivery Anes PTL Lv   3 Current            2 Term 19   3.374 kg F Vag-Spont EPI N SHEBA   1 Term 18 41w0d  3.572 kg F Vag-Spont EPI N SHEBA       Past Medical History  Past Medical History:   Diagnosis Date    ASCUS with positive high risk HPV cervical     Migraine     Other conditions influencing health status     History of vaginal delivery    Other conditions influencing health status 2015    ASCUS with positive high risk HPV    Personal history of other diseases of the nervous system and sense organs     History of migraine        Past Surgical History   Past Surgical History:   Procedure Laterality Date    MOUTH SURGERY  2014    Oral Surgery Tooth Extraction    OTHER SURGICAL HISTORY  2017    Corneal LASIK Bilateral       Social History  Social History     Tobacco Use    Smoking status: Never    Smokeless tobacco: Never   Substance Use Topics    Alcohol use: Not Currently     Substance and Sexual Activity   Drug Use Never       Allergies  Patient has no known allergies.     Medications  (Not in a hospital admission)      Objective    Last Vitals  Temp Pulse Resp BP MAP O2 Sat                   Physical Examination  GENERAL: Examination reveals a well developed, well nourished, gravid female in no acute distress. She is alert and cooperative.  LUNGS: clear to auscultation bilaterally  HEART: regular rate and rhythm, S1, S2 normal, no murmur, click, rub or gallop  ABDOMEN: soft, gravid, nontender, nondistended, no abnormal masses, no epigastric pain  FHR is 145.    The fetus is in a vertex presentation, determined by ultrasound  Current Estimated Fetal  Weight 7 lbs 8 oz established by Leopold's maneuver  CERVIX:  1/70/-1 ; MEMBRANES are  in tact  EXTREMITIES: no redness or tenderness in the calves or thighs, no edema  PSYCHOLOGICAL: awake and alert; oriented to person, place, and time    Lab Review  GBS pending  Angelica Wasserman DO

## 2024-02-10 LAB — GP B STREP GENITAL QL CULT: NORMAL

## 2024-02-16 ENCOUNTER — ROUTINE PRENATAL (OUTPATIENT)
Dept: OBSTETRICS AND GYNECOLOGY | Facility: CLINIC | Age: 34
End: 2024-02-16
Payer: COMMERCIAL

## 2024-02-16 VITALS — BODY MASS INDEX: 28.54 KG/M2 | WEIGHT: 189.2 LBS | SYSTOLIC BLOOD PRESSURE: 126 MMHG | DIASTOLIC BLOOD PRESSURE: 80 MMHG

## 2024-02-16 DIAGNOSIS — Z34.83 PRENATAL CARE, SUBSEQUENT PREGNANCY, THIRD TRIMESTER (HHS-HCC): Primary | ICD-10-CM

## 2024-02-16 DIAGNOSIS — Z3A.37 37 WEEKS GESTATION OF PREGNANCY (HHS-HCC): ICD-10-CM

## 2024-02-16 LAB
POC GLUCOSE, URINE: NEGATIVE MG/DL
POC PROTEIN, URINE: ABNORMAL MG/DL

## 2024-02-16 PROCEDURE — 0501F PRENATAL FLOW SHEET: CPT | Performed by: OBSTETRICS & GYNECOLOGY

## 2024-02-16 NOTE — PROGRESS NOTES
Prenatal Visit    Patient presents for routine prenatal visit.   Feeling well overall.  Baby is moving, more in the evening. No regular contractions. Slight bleeding after last office exam, none since. No leaking fluid.    Objective   Physical Exam:   Weight: 85.8 kg (189 lb 3.2 oz)  Expected Total Weight Gain: 11.5 kg (25 lb)-16 kg (35 lb)   Pregravid BMI: 23.99  BP: 126/80  Fetal Heart Rate: 150 Fundal Height (cm): 36 cm Presentation: Vertex  Dilation: 1 Effacement (%): 70 Fetal Station: -1    Assessment/Plan   1. 37 weeks gestation of pregnancy  - POCT UA Automated manually resulted  2. Prenatal care, subsequent pregnancy, third trimester    Doing well.  Continue prenatal vitamins  Labor precautions given. Advised her to call for any concerns.  Follow up in 1 week.

## 2024-02-22 ENCOUNTER — TELEPHONE (OUTPATIENT)
Dept: OBSTETRICS AND GYNECOLOGY | Facility: CLINIC | Age: 34
End: 2024-02-22
Payer: COMMERCIAL

## 2024-02-22 NOTE — TELEPHONE ENCOUNTER
Called patient back and told her to continue zithromax and that she will be okay for induction per Dr Rm

## 2024-02-22 NOTE — PROGRESS NOTES
Subjective   Patient ID [id number]   Rosemary Tellez is a 33 y.o.  at 38w4d, was treated for strep recently. Was getting high heart rate alerts. Went to urgent care. Not feeling palpitations. +FM, here and there contractions, back pain.    Her pregnancy is complicated by:  AMA  Isolated right urinary tract dilation    Objective   Physical Exam  Weight: 84.8 kg (187 lb)  BP: 98/70  Fetal Heart Rate: 150 Fundal Height (cm): 36 cm    Lab Results   Component Value Date    HGB 11.6 (L) 2023    HCT 35.5 (L) 2023       Assessment/Plan   Membranes stripped.  Induction next week. Patient request moving it to  night.

## 2024-02-23 ENCOUNTER — ROUTINE PRENATAL (OUTPATIENT)
Dept: OBSTETRICS AND GYNECOLOGY | Facility: CLINIC | Age: 34
End: 2024-02-23
Payer: COMMERCIAL

## 2024-02-23 VITALS — SYSTOLIC BLOOD PRESSURE: 98 MMHG | BODY MASS INDEX: 28.21 KG/M2 | WEIGHT: 187 LBS | DIASTOLIC BLOOD PRESSURE: 70 MMHG

## 2024-02-23 DIAGNOSIS — Z3A.38 38 WEEKS GESTATION OF PREGNANCY (HHS-HCC): Primary | ICD-10-CM

## 2024-02-23 LAB
POC GLUCOSE, URINE: NEGATIVE MG/DL
POC PROTEIN, URINE: ABNORMAL MG/DL

## 2024-02-23 PROCEDURE — 0501F PRENATAL FLOW SHEET: CPT | Performed by: OBSTETRICS & GYNECOLOGY

## 2024-02-23 RX ORDER — AZITHROMYCIN 250 MG/1
TABLET, FILM COATED ORAL
COMMUNITY
Start: 2024-02-22 | End: 2024-02-27 | Stop reason: HOSPADM

## 2024-02-25 ENCOUNTER — HOSPITAL ENCOUNTER (INPATIENT)
Facility: HOSPITAL | Age: 34
LOS: 2 days | Discharge: HOME | End: 2024-02-27
Attending: OBSTETRICS & GYNECOLOGY | Admitting: OBSTETRICS & GYNECOLOGY
Payer: COMMERCIAL

## 2024-02-25 ENCOUNTER — APPOINTMENT (OUTPATIENT)
Dept: OBSTETRICS AND GYNECOLOGY | Facility: HOSPITAL | Age: 34
End: 2024-02-25
Payer: COMMERCIAL

## 2024-02-25 DIAGNOSIS — Z34.90 TERM PREGNANCY (HHS-HCC): ICD-10-CM

## 2024-02-25 DIAGNOSIS — Z34.83 MULTIGRAVIDA IN THIRD TRIMESTER (HHS-HCC): ICD-10-CM

## 2024-02-25 LAB
ABO GROUP (TYPE) IN BLOOD: NORMAL
ANTIBODY SCREEN: NORMAL
ERYTHROCYTE [DISTWIDTH] IN BLOOD BY AUTOMATED COUNT: 12.1 % (ref 11.5–14.5)
HCT VFR BLD AUTO: 34.6 % (ref 36–46)
HGB BLD-MCNC: 11.5 G/DL (ref 12–16)
MCH RBC QN AUTO: 29.9 PG (ref 26–34)
MCHC RBC AUTO-ENTMCNC: 33.2 G/DL (ref 32–36)
MCV RBC AUTO: 90 FL (ref 80–100)
NRBC BLD-RTO: 0 /100 WBCS (ref 0–0)
PLATELET # BLD AUTO: 219 X10*3/UL (ref 150–450)
RBC # BLD AUTO: 3.85 X10*6/UL (ref 4–5.2)
RH FACTOR (ANTIGEN D): NORMAL
WBC # BLD AUTO: 6.6 X10*3/UL (ref 4.4–11.3)

## 2024-02-25 PROCEDURE — 36415 COLL VENOUS BLD VENIPUNCTURE: CPT | Performed by: OBSTETRICS & GYNECOLOGY

## 2024-02-25 PROCEDURE — 1120000001 HC OB PRIVATE ROOM DAILY

## 2024-02-25 PROCEDURE — 86780 TREPONEMA PALLIDUM: CPT | Mod: GEALAB | Performed by: OBSTETRICS & GYNECOLOGY

## 2024-02-25 PROCEDURE — 59050 FETAL MONITOR W/REPORT: CPT

## 2024-02-25 PROCEDURE — 2500000004 HC RX 250 GENERAL PHARMACY W/ HCPCS (ALT 636 FOR OP/ED): Performed by: OBSTETRICS & GYNECOLOGY

## 2024-02-25 PROCEDURE — 85027 COMPLETE CBC AUTOMATED: CPT | Performed by: OBSTETRICS & GYNECOLOGY

## 2024-02-25 PROCEDURE — 86901 BLOOD TYPING SEROLOGIC RH(D): CPT | Performed by: OBSTETRICS & GYNECOLOGY

## 2024-02-25 RX ORDER — CARBOPROST TROMETHAMINE 250 UG/ML
250 INJECTION, SOLUTION INTRAMUSCULAR ONCE AS NEEDED
Status: DISCONTINUED | OUTPATIENT
Start: 2024-02-25 | End: 2024-02-26

## 2024-02-25 RX ORDER — SODIUM CHLORIDE, SODIUM LACTATE, POTASSIUM CHLORIDE, CALCIUM CHLORIDE 600; 310; 30; 20 MG/100ML; MG/100ML; MG/100ML; MG/100ML
125 INJECTION, SOLUTION INTRAVENOUS CONTINUOUS
Status: DISCONTINUED | OUTPATIENT
Start: 2024-02-25 | End: 2024-02-26

## 2024-02-25 RX ORDER — OXYTOCIN/0.9 % SODIUM CHLORIDE 30/500 ML
2-30 PLASTIC BAG, INJECTION (ML) INTRAVENOUS CONTINUOUS
Status: DISCONTINUED | OUTPATIENT
Start: 2024-02-25 | End: 2024-02-26

## 2024-02-25 RX ORDER — TRANEXAMIC ACID 100 MG/ML
1000 INJECTION, SOLUTION INTRAVENOUS ONCE AS NEEDED
Status: DISCONTINUED | OUTPATIENT
Start: 2024-02-25 | End: 2024-02-26

## 2024-02-25 RX ORDER — MISOPROSTOL 200 UG/1
800 TABLET ORAL ONCE AS NEEDED
Status: DISCONTINUED | OUTPATIENT
Start: 2024-02-25 | End: 2024-02-26

## 2024-02-25 RX ORDER — LABETALOL HYDROCHLORIDE 5 MG/ML
20 INJECTION, SOLUTION INTRAVENOUS ONCE AS NEEDED
Status: DISCONTINUED | OUTPATIENT
Start: 2024-02-25 | End: 2024-02-26

## 2024-02-25 RX ORDER — METOCLOPRAMIDE 10 MG/1
10 TABLET ORAL EVERY 6 HOURS PRN
Status: DISCONTINUED | OUTPATIENT
Start: 2024-02-25 | End: 2024-02-26

## 2024-02-25 RX ORDER — LOPERAMIDE HYDROCHLORIDE 2 MG/1
4 CAPSULE ORAL EVERY 2 HOUR PRN
Status: DISCONTINUED | OUTPATIENT
Start: 2024-02-25 | End: 2024-02-26

## 2024-02-25 RX ORDER — FENTANYL/ROPIVACAINE/NS/PF 2MCG/ML-.2
0-25 PLASTIC BAG, INJECTION (ML) INJECTION CONTINUOUS
Status: DISCONTINUED | OUTPATIENT
Start: 2024-02-25 | End: 2024-02-26

## 2024-02-25 RX ORDER — METOCLOPRAMIDE HYDROCHLORIDE 5 MG/ML
10 INJECTION INTRAMUSCULAR; INTRAVENOUS EVERY 6 HOURS PRN
Status: DISCONTINUED | OUTPATIENT
Start: 2024-02-25 | End: 2024-02-26

## 2024-02-25 RX ORDER — OXYTOCIN 10 [USP'U]/ML
10 INJECTION, SOLUTION INTRAMUSCULAR; INTRAVENOUS ONCE AS NEEDED
Status: DISCONTINUED | OUTPATIENT
Start: 2024-02-25 | End: 2024-02-26

## 2024-02-25 RX ORDER — TERBUTALINE SULFATE 1 MG/ML
0.25 INJECTION SUBCUTANEOUS ONCE AS NEEDED
Status: DISCONTINUED | OUTPATIENT
Start: 2024-02-25 | End: 2024-02-26

## 2024-02-25 RX ORDER — OXYTOCIN/0.9 % SODIUM CHLORIDE 30/500 ML
60 PLASTIC BAG, INJECTION (ML) INTRAVENOUS ONCE AS NEEDED
Status: DISCONTINUED | OUTPATIENT
Start: 2024-02-25 | End: 2024-02-26

## 2024-02-25 RX ORDER — METHYLERGONOVINE MALEATE 0.2 MG/ML
0.2 INJECTION INTRAVENOUS ONCE AS NEEDED
Status: DISCONTINUED | OUTPATIENT
Start: 2024-02-25 | End: 2024-02-26

## 2024-02-25 RX ORDER — NIFEDIPINE 10 MG/1
10 CAPSULE ORAL ONCE AS NEEDED
Status: DISCONTINUED | OUTPATIENT
Start: 2024-02-25 | End: 2024-02-26

## 2024-02-25 RX ORDER — HYDRALAZINE HYDROCHLORIDE 20 MG/ML
5 INJECTION INTRAMUSCULAR; INTRAVENOUS ONCE AS NEEDED
Status: DISCONTINUED | OUTPATIENT
Start: 2024-02-25 | End: 2024-02-26

## 2024-02-25 RX ORDER — LIDOCAINE HYDROCHLORIDE 10 MG/ML
30 INJECTION INFILTRATION; PERINEURAL ONCE AS NEEDED
Status: DISCONTINUED | OUTPATIENT
Start: 2024-02-25 | End: 2024-02-26

## 2024-02-25 RX ADMIN — SODIUM CHLORIDE, POTASSIUM CHLORIDE, SODIUM LACTATE AND CALCIUM CHLORIDE 125 ML/HR: 600; 310; 30; 20 INJECTION, SOLUTION INTRAVENOUS at 21:38

## 2024-02-25 SDOH — HEALTH STABILITY: MENTAL HEALTH: WISH TO BE DEAD (PAST 1 MONTH): NO

## 2024-02-25 SDOH — SOCIAL STABILITY: SOCIAL INSECURITY: ARE YOU OR HAVE YOU BEEN THREATENED OR ABUSED PHYSICALLY, EMOTIONALLY, OR SEXUALLY BY ANYONE?: NO

## 2024-02-25 SDOH — SOCIAL STABILITY: SOCIAL INSECURITY: HAVE YOU HAD THOUGHTS OF HARMING ANYONE ELSE?: NO

## 2024-02-25 SDOH — SOCIAL STABILITY: SOCIAL INSECURITY: ABUSE SCREEN: ADULT

## 2024-02-25 SDOH — HEALTH STABILITY: MENTAL HEALTH: SUICIDAL BEHAVIOR (LIFETIME): NO

## 2024-02-25 SDOH — SOCIAL STABILITY: SOCIAL INSECURITY: PHYSICAL ABUSE: DENIES

## 2024-02-25 SDOH — SOCIAL STABILITY: SOCIAL INSECURITY: DOES ANYONE TRY TO KEEP YOU FROM HAVING/CONTACTING OTHER FRIENDS OR DOING THINGS OUTSIDE YOUR HOME?: NO

## 2024-02-25 SDOH — HEALTH STABILITY: MENTAL HEALTH: WERE YOU ABLE TO COMPLETE ALL THE BEHAVIORAL HEALTH SCREENINGS?: YES

## 2024-02-25 SDOH — SOCIAL STABILITY: SOCIAL INSECURITY: ARE THERE ANY APPARENT SIGNS OF INJURIES/BEHAVIORS THAT COULD BE RELATED TO ABUSE/NEGLECT?: NO

## 2024-02-25 SDOH — ECONOMIC STABILITY: HOUSING INSECURITY: DO YOU FEEL UNSAFE GOING BACK TO THE PLACE WHERE YOU ARE LIVING?: NO

## 2024-02-25 SDOH — SOCIAL STABILITY: SOCIAL INSECURITY: DO YOU FEEL ANYONE HAS EXPLOITED OR TAKEN ADVANTAGE OF YOU FINANCIALLY OR OF YOUR PERSONAL PROPERTY?: NO

## 2024-02-25 SDOH — SOCIAL STABILITY: SOCIAL INSECURITY: VERBAL ABUSE: DENIES

## 2024-02-25 SDOH — HEALTH STABILITY: MENTAL HEALTH: NON-SPECIFIC ACTIVE SUICIDAL THOUGHTS (PAST 1 MONTH): NO

## 2024-02-25 SDOH — SOCIAL STABILITY: SOCIAL INSECURITY: HAS ANYONE EVER THREATENED TO HURT YOUR FAMILY OR YOUR PETS?: NO

## 2024-02-25 ASSESSMENT — ACTIVITIES OF DAILY LIVING (ADL): LACK_OF_TRANSPORTATION: NO

## 2024-02-25 ASSESSMENT — PAIN SCALES - GENERAL: PAINLEVEL_OUTOF10: 0 - NO PAIN

## 2024-02-25 ASSESSMENT — PATIENT HEALTH QUESTIONNAIRE - PHQ9
SUM OF ALL RESPONSES TO PHQ9 QUESTIONS 1 & 2: 0
2. FEELING DOWN, DEPRESSED OR HOPELESS: NOT AT ALL
1. LITTLE INTEREST OR PLEASURE IN DOING THINGS: NOT AT ALL

## 2024-02-25 ASSESSMENT — LIFESTYLE VARIABLES
AUDIT-C TOTAL SCORE: 0
SKIP TO QUESTIONS 9-10: 1
HOW OFTEN DO YOU HAVE 6 OR MORE DRINKS ON ONE OCCASION: NEVER
AUDIT-C TOTAL SCORE: 0
HOW OFTEN DO YOU HAVE A DRINK CONTAINING ALCOHOL: NEVER
HOW MANY STANDARD DRINKS CONTAINING ALCOHOL DO YOU HAVE ON A TYPICAL DAY: PATIENT DOES NOT DRINK

## 2024-02-26 ENCOUNTER — ANESTHESIA EVENT (OUTPATIENT)
Dept: OBSTETRICS AND GYNECOLOGY | Facility: HOSPITAL | Age: 34
End: 2024-02-26
Payer: COMMERCIAL

## 2024-02-26 ENCOUNTER — ANESTHESIA (OUTPATIENT)
Dept: OBSTETRICS AND GYNECOLOGY | Facility: HOSPITAL | Age: 34
End: 2024-02-26
Payer: COMMERCIAL

## 2024-02-26 ENCOUNTER — APPOINTMENT (OUTPATIENT)
Dept: OBSTETRICS AND GYNECOLOGY | Facility: HOSPITAL | Age: 34
End: 2024-02-26
Payer: COMMERCIAL

## 2024-02-26 PROBLEM — N89.8 VAGINAL ODOR: Status: RESOLVED | Noted: 2023-09-29 | Resolved: 2024-02-26

## 2024-02-26 PROBLEM — Z3A.38 38 WEEKS GESTATION OF PREGNANCY (HHS-HCC): Status: RESOLVED | Noted: 2024-02-23 | Resolved: 2024-02-26

## 2024-02-26 PROBLEM — Z3A.28 28 WEEKS GESTATION OF PREGNANCY (HHS-HCC): Status: RESOLVED | Noted: 2023-12-15 | Resolved: 2024-02-26

## 2024-02-26 LAB — TREPONEMA PALLIDUM IGG+IGM AB [PRESENCE] IN SERUM OR PLASMA BY IMMUNOASSAY: NONREACTIVE

## 2024-02-26 PROCEDURE — 3E033VJ INTRODUCTION OF OTHER HORMONE INTO PERIPHERAL VEIN, PERCUTANEOUS APPROACH: ICD-10-PCS | Performed by: OBSTETRICS & GYNECOLOGY

## 2024-02-26 PROCEDURE — 2500000005 HC RX 250 GENERAL PHARMACY W/O HCPCS: Performed by: NURSE ANESTHETIST, CERTIFIED REGISTERED

## 2024-02-26 PROCEDURE — 2500000004 HC RX 250 GENERAL PHARMACY W/ HCPCS (ALT 636 FOR OP/ED): Performed by: NURSE ANESTHETIST, CERTIFIED REGISTERED

## 2024-02-26 PROCEDURE — 1120000001 HC OB PRIVATE ROOM DAILY

## 2024-02-26 PROCEDURE — 59409 OBSTETRICAL CARE: CPT | Performed by: OBSTETRICS & GYNECOLOGY

## 2024-02-26 PROCEDURE — 10907ZC DRAINAGE OF AMNIOTIC FLUID, THERAPEUTIC FROM PRODUCTS OF CONCEPTION, VIA NATURAL OR ARTIFICIAL OPENING: ICD-10-PCS | Performed by: OBSTETRICS & GYNECOLOGY

## 2024-02-26 PROCEDURE — 2720000007 HC OR 272 NO HCPCS

## 2024-02-26 PROCEDURE — 2500000004 HC RX 250 GENERAL PHARMACY W/ HCPCS (ALT 636 FOR OP/ED): Performed by: OBSTETRICS & GYNECOLOGY

## 2024-02-26 PROCEDURE — 01967 NEURAXL LBR ANES VAG DLVR: CPT | Performed by: NURSE ANESTHETIST, CERTIFIED REGISTERED

## 2024-02-26 PROCEDURE — 59400 OBSTETRICAL CARE: CPT | Performed by: OBSTETRICS & GYNECOLOGY

## 2024-02-26 RX ORDER — POLYETHYLENE GLYCOL 3350 17 G/17G
17 POWDER, FOR SOLUTION ORAL 2 TIMES DAILY PRN
Status: DISCONTINUED | OUTPATIENT
Start: 2024-02-26 | End: 2024-02-27 | Stop reason: HOSPADM

## 2024-02-26 RX ORDER — TRANEXAMIC ACID 100 MG/ML
1000 INJECTION, SOLUTION INTRAVENOUS ONCE AS NEEDED
Status: DISCONTINUED | OUTPATIENT
Start: 2024-02-26 | End: 2024-02-27 | Stop reason: HOSPADM

## 2024-02-26 RX ORDER — LOPERAMIDE HYDROCHLORIDE 2 MG/1
4 CAPSULE ORAL EVERY 2 HOUR PRN
Status: DISCONTINUED | OUTPATIENT
Start: 2024-02-26 | End: 2024-02-27 | Stop reason: HOSPADM

## 2024-02-26 RX ORDER — OXYTOCIN/0.9 % SODIUM CHLORIDE 30/500 ML
60 PLASTIC BAG, INJECTION (ML) INTRAVENOUS ONCE AS NEEDED
Status: DISCONTINUED | OUTPATIENT
Start: 2024-02-26 | End: 2024-02-27 | Stop reason: HOSPADM

## 2024-02-26 RX ORDER — ACETAMINOPHEN 325 MG/1
975 TABLET ORAL EVERY 6 HOURS
Status: DISCONTINUED | OUTPATIENT
Start: 2024-02-26 | End: 2024-02-27 | Stop reason: HOSPADM

## 2024-02-26 RX ORDER — OXYTOCIN 10 [USP'U]/ML
10 INJECTION, SOLUTION INTRAMUSCULAR; INTRAVENOUS ONCE AS NEEDED
Status: DISCONTINUED | OUTPATIENT
Start: 2024-02-26 | End: 2024-02-27 | Stop reason: HOSPADM

## 2024-02-26 RX ORDER — ADHESIVE BANDAGE
10 BANDAGE TOPICAL
Status: DISCONTINUED | OUTPATIENT
Start: 2024-02-26 | End: 2024-02-27 | Stop reason: HOSPADM

## 2024-02-26 RX ORDER — LIDOCAINE HYDROCHLORIDE AND EPINEPHRINE 15; 5 MG/ML; UG/ML
INJECTION, SOLUTION EPIDURAL AS NEEDED
Status: DISCONTINUED | OUTPATIENT
Start: 2024-02-26 | End: 2024-02-26

## 2024-02-26 RX ORDER — ONDANSETRON 4 MG/1
4 TABLET, FILM COATED ORAL EVERY 6 HOURS PRN
Status: DISCONTINUED | OUTPATIENT
Start: 2024-02-26 | End: 2024-02-27 | Stop reason: HOSPADM

## 2024-02-26 RX ORDER — FENTANYL/ROPIVACAINE/NS/PF 2MCG/ML-.2
0-25 PLASTIC BAG, INJECTION (ML) INJECTION CONTINUOUS
Status: DISCONTINUED | OUTPATIENT
Start: 2024-02-26 | End: 2024-02-26

## 2024-02-26 RX ORDER — CARBOPROST TROMETHAMINE 250 UG/ML
250 INJECTION, SOLUTION INTRAMUSCULAR ONCE AS NEEDED
Status: DISCONTINUED | OUTPATIENT
Start: 2024-02-26 | End: 2024-02-27 | Stop reason: HOSPADM

## 2024-02-26 RX ORDER — METHYLERGONOVINE MALEATE 0.2 MG/ML
0.2 INJECTION INTRAVENOUS ONCE AS NEEDED
Status: DISCONTINUED | OUTPATIENT
Start: 2024-02-26 | End: 2024-02-27 | Stop reason: HOSPADM

## 2024-02-26 RX ORDER — LABETALOL HYDROCHLORIDE 5 MG/ML
20 INJECTION, SOLUTION INTRAVENOUS ONCE AS NEEDED
Status: DISCONTINUED | OUTPATIENT
Start: 2024-02-26 | End: 2024-02-27 | Stop reason: HOSPADM

## 2024-02-26 RX ORDER — ONDANSETRON HYDROCHLORIDE 2 MG/ML
4 INJECTION, SOLUTION INTRAVENOUS EVERY 6 HOURS PRN
Status: DISCONTINUED | OUTPATIENT
Start: 2024-02-26 | End: 2024-02-27 | Stop reason: HOSPADM

## 2024-02-26 RX ORDER — IBUPROFEN 600 MG/1
600 TABLET ORAL EVERY 6 HOURS
Status: DISCONTINUED | OUTPATIENT
Start: 2024-02-26 | End: 2024-02-27 | Stop reason: HOSPADM

## 2024-02-26 RX ORDER — MISOPROSTOL 200 UG/1
800 TABLET ORAL ONCE AS NEEDED
Status: DISCONTINUED | OUTPATIENT
Start: 2024-02-26 | End: 2024-02-27 | Stop reason: HOSPADM

## 2024-02-26 RX ORDER — NIFEDIPINE 10 MG/1
10 CAPSULE ORAL ONCE AS NEEDED
Status: DISCONTINUED | OUTPATIENT
Start: 2024-02-26 | End: 2024-02-27 | Stop reason: HOSPADM

## 2024-02-26 RX ORDER — LIDOCAINE 560 MG/1
1 PATCH PERCUTANEOUS; TOPICAL; TRANSDERMAL
Status: DISCONTINUED | OUTPATIENT
Start: 2024-02-26 | End: 2024-02-27 | Stop reason: HOSPADM

## 2024-02-26 RX ORDER — SIMETHICONE 80 MG
80 TABLET,CHEWABLE ORAL 4 TIMES DAILY PRN
Status: DISCONTINUED | OUTPATIENT
Start: 2024-02-26 | End: 2024-02-27 | Stop reason: HOSPADM

## 2024-02-26 RX ORDER — DIPHENHYDRAMINE HCL 25 MG
25 CAPSULE ORAL EVERY 6 HOURS PRN
Status: DISCONTINUED | OUTPATIENT
Start: 2024-02-26 | End: 2024-02-27 | Stop reason: HOSPADM

## 2024-02-26 RX ORDER — HYDRALAZINE HYDROCHLORIDE 20 MG/ML
5 INJECTION INTRAMUSCULAR; INTRAVENOUS ONCE AS NEEDED
Status: DISCONTINUED | OUTPATIENT
Start: 2024-02-26 | End: 2024-02-27 | Stop reason: HOSPADM

## 2024-02-26 RX ORDER — BISACODYL 10 MG/1
10 SUPPOSITORY RECTAL DAILY PRN
Status: DISCONTINUED | OUTPATIENT
Start: 2024-02-26 | End: 2024-02-27 | Stop reason: HOSPADM

## 2024-02-26 RX ORDER — DIPHENHYDRAMINE HYDROCHLORIDE 50 MG/ML
25 INJECTION INTRAMUSCULAR; INTRAVENOUS EVERY 6 HOURS PRN
Status: DISCONTINUED | OUTPATIENT
Start: 2024-02-26 | End: 2024-02-27 | Stop reason: HOSPADM

## 2024-02-26 RX ADMIN — SODIUM CHLORIDE, POTASSIUM CHLORIDE, SODIUM LACTATE AND CALCIUM CHLORIDE 125 ML/HR: 600; 310; 30; 20 INJECTION, SOLUTION INTRAVENOUS at 02:13

## 2024-02-26 RX ADMIN — Medication 2 MILLI-UNITS/MIN: at 05:05

## 2024-02-26 RX ADMIN — SODIUM CHLORIDE, POTASSIUM CHLORIDE, SODIUM LACTATE AND CALCIUM CHLORIDE 500 ML: 600; 310; 30; 20 INJECTION, SOLUTION INTRAVENOUS at 09:18

## 2024-02-26 RX ADMIN — Medication 12 ML/HR: at 10:01

## 2024-02-26 RX ADMIN — Medication 6 ML: at 09:58

## 2024-02-26 RX ADMIN — LIDOCAINE HYDROCHLORIDE AND EPINEPHRINE 3 ML: 15; 5 INJECTION, SOLUTION EPIDURAL at 09:57

## 2024-02-26 SDOH — HEALTH STABILITY: MENTAL HEALTH: CURRENT SMOKER: 0

## 2024-02-26 ASSESSMENT — PAIN SCALES - GENERAL
PAINLEVEL_OUTOF10: 0 - NO PAIN
PAINLEVEL_OUTOF10: 2
PAINLEVEL_OUTOF10: 6
PAIN_LEVEL: 0
PAINLEVEL_OUTOF10: 8
PAINLEVEL_OUTOF10: 0 - NO PAIN

## 2024-02-26 NOTE — PROGRESS NOTES
Intrapartum Progress Note    Subjective   Feels contractions.    Objective   Last Vitals:  /81 Temp 36.5 °C (97.7 °F) Pulse 75     Tracing: cat 1 tracing,   Ctx: every 2-4 mins  Pitocin 10 mu    VE: 3cm/80%-2/vtx    Continue pitocin    Marissa Baez MD

## 2024-02-26 NOTE — ANESTHESIA PREPROCEDURE EVALUATION
Patient: Rosemary Tellez    Evaluation Method: In-person visit    Procedure Information    Date: 02/26/24  Procedure: Labor Analgesia         Relevant Problems   /Renal   (+) Fetal renal anomaly, single gestation      Infectious Disease   (+) Onychomycosis of toenail       Clinical information reviewed:   Tobacco  Allergies  Meds   Med Hx  Surg Hx   Fam Hx  Soc Hx        NPO Detail:  No data recorded     OB/Gyn Evaluation    Present Pregnancy    Patient is pregnant now.   Obstetric History                Physical Exam    Airway  Mallampati: II     Cardiovascular   Rhythm: regular     Dental    Pulmonary    Abdominal            Anesthesia Plan    History of general anesthesia?: no  History of complications of general anesthesia?: no    ASA 2     epidural     The patient is not a current smoker.    Anesthetic plan and risks discussed with patient.

## 2024-02-26 NOTE — L&D DELIVERY NOTE
OB Delivery Note  2024  Rosemary Tellez  33 y.o.   Vaginal, Spontaneous        Gestational Age: 39w0d  /Para:   Quantitative Blood Loss: Admission to Discharge: 0 mL (2024  8:23 PM - 2024  1:06 PM)    Eleuterio Tellez [[id number]]      Labor Events    Sac identifier: Sac 1  Rupture date/time: 2024 1035  Rupture type: Artificial  Fluid color: Meconium  Fluid odor: None  Labor type: Induced Onset of Labor  Labor allowed to proceed with plans for an attempted vaginal birth?: Yes  Induction: Misoprostol  Complications: None       Labor Event Times    Dilation complete date/time: 2024 1204       Placenta    Placenta delivery date/time: 2024 1223  Placenta removal: Spontaneous  Placenta appearance: Intact  Placenta disposition: discarded       Cord    Vessels: 3 vessels  Complications: None, Nuchal  Nuchal intervention: reduced  Nuchal cord description: loose nuchal cord  Number of loops: 1  Delayed cord clamping?: Yes  Cord clamped date/time: 2024 1221  Cord blood disposition: Lab  Gases sent?: No  Stem cell collection (by provider): No       Lacerations    Episiotomy: None  Perineal laceration: None  Other lacerations?: No  Repair suture: None       Anesthesia    Method: Epidural       Operative Delivery    Forceps attempted?: No  Vacuum extractor attempted?: No       Shoulder Dystocia    Shoulder dystocia present?: No       [place] Delivery    Birth date/time: 2024 12:19:00  Delivery type: Vaginal, Spontaneous  Complications: None       Resuscitation    Method: None       Apgars    Living status: Living  Apgar Component Scores:  1 min.:  5 min.:  10 min.:  15 min.:  20 min.:    Skin color:  1  1       Heart rate:  2  2       Reflex irritability:  2  2       Muscle tone:  2  2       Respiratory effort:  2  2       Total:  9  9       Apgars assigned by: LB       Delivery Providers    Delivering clinician: Marissa Baez MD   Provider Role    Blanca Yi RN Delivery  Nurse    Mishel Rosas RN Nursery Nurse     Resident             The patient pushed for 1 contraction and delivered by .  Nuchal cord x 1 reduced.  MSF, mouth and nares bulb suctioned. Following delivery the infant was placed on the mother's chest for skin to skin care. Delayed cord clamping was done. The placenta delivered spontaneously.  The fundus was expressed and found to be firm and below the umbilicus. Postpartum bleeding was found to be appropriate.       Marissa Baez MD

## 2024-02-26 NOTE — SIGNIFICANT EVENT
Called provider for an update on pt being 3cm. Dr. Stanley wanted to recheck the pt cervix in an hour and report back to her to see if intervention is needed for induction or pt will labor on her own.

## 2024-02-26 NOTE — SIGNIFICANT EVENT
Notified Dr. Stanley about pts recheck cervical exam. Expressed that pts water may be broken. Dr. Stanley advised to allow the pt to labor on her own and if contractions space out to start pitocin. No other orders at this time.

## 2024-02-26 NOTE — ANESTHESIA PROCEDURE NOTES
Epidural Block    Patient location during procedure: OB  Start time: 2/26/2024 9:40 AM  End time: 2/26/2024 9:45 AM  Reason for block: labor analgesia  Staffing  Performed: CRNA   Authorized by: RUDOLPH Maradiaga    Performed by: RUDOLPH Maradiaga    Preanesthetic Checklist  Completed: patient identified, IV checked, risks and benefits discussed, surgical consent, pre-op evaluation, timeout performed and sterile techniques followed  Block Timeout  RN/Licensed healthcare professional reads aloud to the Anesthesia provider and entire team: Patient identity, procedure with side and site, patient position, and as applicable the availability of implants/special equipment/special requirements.  Patient on coagulant treatment: no  Timeout performed at: 2/26/2024 9:35 AM  Block Placement  Patient position: sitting  Prep: ChloraPrep  Sterility prep: cap, drape, gloves, hand and mask  Sedation level: no sedation  Patient monitoring: blood pressure, continuous pulse oximetry and heart rate  Approach: midline  Local numbing: lidocaine 1% to skin and subcutaneous tissues  Vertebral space: lumbar  Lumbar location: L3-L4  Epidural  Loss of resistance technique: saline  Guidance: landmark technique        Needle  Needle type: Tuohy   Needle gauge: 17  Needle length: 9 cm  Needle insertion depth: 7 cm  Catheter type: multi-orifice  Catheter size: 19 G  Catheter at skin depth: 14 cm  Catheter securement method: clear occlusive dressing    Test dose: lidocaine 1.5% with epinephrine 1-to-200,000  Test dose: lidocaine 1.5% with epinephrine 1-to-200,000  Test dose result: no positive test dose            Assessment  Sensory level: T10 bilateral  Block outcome: pain improved  Number of attempts: 1  Events: no positive test dose  Procedure assessment: patient tolerated procedure well with no immediate complications

## 2024-02-26 NOTE — PROGRESS NOTES
Obstetrical Admission History and Physical     Nurse exam, 3cm  CTN q2-3  Category 1     Obstetrical History   OB History    Para Term  AB Living   3 2 2 0 0 2   SAB IAB Ectopic Multiple Live Births   0 0 0 0 2      # Outcome Date GA Lbr Delmer/2nd Weight Sex Delivery Anes PTL Lv   3 Current            2 Term 19   3.374 kg F Vag-Spont EPI N SHEBA   1 Term 18 41w0d  3.572 kg F Vag-Spont EPI N SHEBA       Past Medical History  Past Medical History:   Diagnosis Date    ASCUS with positive high risk HPV cervical     Migraine     Other conditions influencing health status     History of vaginal delivery    Other conditions influencing health status 2015    ASCUS with positive high risk HPV    Personal history of other diseases of the nervous system and sense organs     History of migraine        Past Surgical History   Past Surgical History:   Procedure Laterality Date    MOUTH SURGERY  2014    Oral Surgery Tooth Extraction    OTHER SURGICAL HISTORY  2017    Corneal LASIK Bilateral       Social History  Social History     Tobacco Use    Smoking status: Never    Smokeless tobacco: Never   Substance Use Topics    Alcohol use: Not Currently     Substance and Sexual Activity   Drug Use Never       Allergies  Patient has no known allergies.     Medications  Medications Prior to Admission   Medication Sig Dispense Refill Last Dose    azithromycin (Zithromax) 250 mg tablet        calcium carbonate-vitamin D3 (Calcium 500 + D) 500 mg-10 mcg (400 unit) chewable tablet Chew 1 tablet once daily.       EPINEPHrine 0.3 mg/0.3 mL injection syringe EPINEPHrine 0.3 MG/0.3ML Injection Solution Auto-injector   Quantity: 2  Refills: 0        Start : 8-Sep-2022   Active       prenatal no.139-iron-folic-dha 33 mg iron- 800 mcg-350 mg combo pack Take by mouth.          Objective    Last Vitals  Temp Pulse Resp BP MAP O2 Sat     79   110/67   96 %     Physical Examination      Lab Review

## 2024-02-26 NOTE — ANESTHESIA POSTPROCEDURE EVALUATION
Patient: Rosemary Tellez    Procedure Summary       Date: 02/26/24 Room / Location:     Anesthesia Start: 0940 Anesthesia Stop: 1219    Procedure: Labor Analgesia Diagnosis:     Scheduled Providers:  Responsible Provider: RUDOLPH Maradiaga    Anesthesia Type: epidural ASA Status: 2            Anesthesia Type: epidural    Visit Vitals  /69   Pulse 80   Temp 36.8 °C (98.3 °F) (Oral)   Resp 18        Anesthesia Post Evaluation    Patient location during evaluation: bedside  Patient participation: complete - patient participated  Level of consciousness: awake and alert  Pain score: 0  Pain management: satisfactory to patient  Airway patency: patent  Cardiovascular status: acceptable  Respiratory status: acceptable  Hydration status: acceptable  Postoperative Nausea and Vomiting: none        No notable events documented.

## 2024-02-26 NOTE — H&P
Obstetrical Admission History and Physical     Rosemary Tellez is a 33 y.o.  at 38w6d. [place]: 3/4/2024, by Last Menstrual Period.     Chief Complaint: No chief complaint on file.    Assessment/Plan       Darcie Rm MD   Physician  Obstetrics and Gynecology     Progress Notes     Signed     Encounter Date: 2024     Signed       Expand All Collapse All       Subjective   Patient ID [id number]   Rosemary Tellez is a 33 y.o.  at 38w4d, was treated for strep recently. Was getting high heart rate alerts. Went to urgent care. Not feeling palpitations. +FM, here and there contractions, back pain.     Her pregnancy is complicated by:  AMA  Isolated right urinary tract dilation           Objective   Physical Exam  Weight: 84.8 kg (187 lb)  BP: 98/70  Fetal Heart Rate: 150 Fundal Height (cm): 36 cm           Lab Results   Component Value Date     HGB 11.6 (L) 2023     HCT 35.5 (L) 2023               Assessment/Plan   Membranes stripped.  Induction next week. Patient request moving it to [place] night.                Active Problems:  There are no active Hospital Problems.      Pregnancy Problems (from 23 to present)       Problem Noted Resolved    38 weeks gestation of pregnancy 2024 by Darcie Rm MD No    Priority:  Medium      28 weeks gestation of pregnancy 12/15/2023 by Darcie Rm MD No    Priority:  Medium            Options for delivery have been discussed with the patient and she elects for an induction of labor.  Cervical ripening with cytotec, cervidil, other prostaglandin agents has been discussed.  Induction of labor with pitocin, amniotomy, cytotec, and cervical balloon have been discussed in detail. The risks, benefits, complications, alternatives, expected outcomes, potential problems during recuperation and recovery, and the risks of not performing the procedure were discussed with the patient. The patient stated understanding that the risks of delivery include, but  are not limited to: death; reaction to medications; injury to bowel, bladder, ureters, uterus, cervix, vagina, and other pelvic and abdominal structures, infection; blood loss and possible need for transfusion; and potential need for surgery, including hysterectomy. The risks of injury to the infant during delivery were also discussed. All questions were answered. There was concurrence with the planned procedure, and the patient wanted to proceed.    Admit to inpatient status. I anticipate that this patient will require a stay exceeding at least 2 midnights for delivery and postpartum.  Induction of labor.  Management of pregnancy complications, as indicated.    Subjective   Good fetal movement. Denies vaginal bleeding.     Reason for Induction of Labor:  Pregnancy at 39 weeks or greater for induction         Obstetrical History   OB History    Para Term  AB Living   3 2 2 0 0 2   SAB IAB Ectopic Multiple Live Births   0 0 0 0 2      # Outcome Date GA Lbr Delmer/2nd Weight Sex Delivery Anes PTL Lv   3 Current            2 Term 19   3.374 kg F Vag-Spont EPI N SHEBA   1 Term 18 41w0d  3.572 kg F Vag-Spont EPI N SHEBA       Past Medical History  Past Medical History:   Diagnosis Date    ASCUS with positive high risk HPV cervical     Migraine     Other conditions influencing health status     History of vaginal delivery    Other conditions influencing health status 2015    ASCUS with positive high risk HPV    Personal history of other diseases of the nervous system and sense organs     History of migraine        Past Surgical History   Past Surgical History:   Procedure Laterality Date    MOUTH SURGERY  2014    Oral Surgery Tooth Extraction    OTHER SURGICAL HISTORY  2017    Corneal LASIK Bilateral       Social History  Social History     Tobacco Use    Smoking status: Never    Smokeless tobacco: Never   Substance Use Topics    Alcohol use: Not Currently     Substance and Sexual  "Activity   Drug Use Never       Allergies  Patient has no known allergies.     Medications  Medications Prior to Admission   Medication Sig Dispense Refill Last Dose    azithromycin (Zithromax) 250 mg tablet        calcium carbonate-vitamin D3 (Calcium 500 + D) 500 mg-10 mcg (400 unit) chewable tablet Chew 1 tablet once daily.       EPINEPHrine 0.3 mg/0.3 mL injection syringe EPINEPHrine 0.3 MG/0.3ML Injection Solution Auto-injector   Quantity: 2  Refills: 0        Start : 8-Sep-2022   Active       prenatal no.139-iron-folic-dha 33 mg iron- 800 mcg-350 mg combo pack Take by mouth.          Objective    Last Vitals  Temp Pulse Resp BP MAP O2 Sat                   Physical Examination  GENERAL: Examination reveals a well developed, well nourished, gravid female in no acute distress. She is alert and cooperative.    Lab Review  No results found for: \"GRPBSTREP\"    "

## 2024-02-26 NOTE — CARE PLAN
VSS, pt pain controlled, FHT had no decels through night. Pitocin started at 0505. Pt safe and free from injury.   Problem: Vaginal Birth or  Section  Goal: Fetal and maternal status remain reassuring during the birth process  Outcome: Progressing  Goal: Tolerate CRB for IOL placement maintenance until dislodgement/removal 12hrs after placement  Outcome: Progressing  Goal: Prevention of malpresentation/labor dystocia through delivery  Outcome: Progressing  Goal: Demonstrates labor coping techniques through delivery  Outcome: Progressing  Goal: Minimal s/sx of HDP and BP<160/110  Outcome: Progressing  Goal: No s/sx of infection through recovery  Outcome: Progressing  Goal: No s/sx of hemorrhage through recovery  Outcome: Progressing     Problem: Pain - Adult  Goal: Verbalizes/displays adequate comfort level or baseline comfort level  Outcome: Progressing     Problem: Safety - Adult  Goal: Free from fall injury  Outcome: Progressing     The patient's goals for the shift include Safe labor and delivery    The clinical goals for the shift include FHT WNL through induction    Over the shift, the patient did make progress toward the following goals. Barriers to progression include n/a. Recommendations to address these barriers include n/a.

## 2024-02-27 VITALS
OXYGEN SATURATION: 97 % | HEIGHT: 69 IN | DIASTOLIC BLOOD PRESSURE: 60 MMHG | BODY MASS INDEX: 28.57 KG/M2 | WEIGHT: 192.9 LBS | TEMPERATURE: 97.9 F | SYSTOLIC BLOOD PRESSURE: 105 MMHG | RESPIRATION RATE: 18 BRPM | HEART RATE: 72 BPM

## 2024-02-27 PROBLEM — B35.1 ONYCHOMYCOSIS OF TOENAIL: Status: RESOLVED | Noted: 2023-09-29 | Resolved: 2024-02-27

## 2024-02-27 PROBLEM — R42 DIZZINESS: Status: RESOLVED | Noted: 2023-09-29 | Resolved: 2024-02-27

## 2024-02-27 PROBLEM — R92.8 ABNORMAL MAMMOGRAM: Status: RESOLVED | Noted: 2023-09-29 | Resolved: 2024-02-27

## 2024-02-27 PROBLEM — R00.2 PALPITATIONS: Status: RESOLVED | Noted: 2023-09-29 | Resolved: 2024-02-27

## 2024-02-27 PROCEDURE — 7100000016 HC LABOR RECOVERY PER HOUR

## 2024-02-27 PROCEDURE — 7210000002 HC LABOR PER HOUR

## 2024-02-27 ASSESSMENT — PAIN SCALES - GENERAL: PAINLEVEL_OUTOF10: 0 - NO PAIN

## 2024-02-27 NOTE — LACTATION NOTE
This note was copied from a baby's chart.  Lactation Consultant Note     02/26/24 [id number]   Lactation Consultation   Reason for Consult Initial assessment   Consultant Name JOE Bell RN, [facility]   Maternal Information   Has mother  before? Yes   How long did the mother previously breastfeed? 8 months   Previous Maternal Breastfeeding Challenges Low milk supply;Difficult latch;Tongue tie   Infant to breast within first 2 hours of birth? Yes   Exclusive Pump and Bottle Feed No   Maternal Assessment   Breast Assessment Medium;Soft;Warm;Compressible;Breast changes observed in pregnancy   Nipple Assessment Intact;Erect   [place] Assessment Normal   Infant Assessment   Infant Behavior Readiness to feed;Feeding cues observed;Rooting response;Sleepy   Infant Assessment   (39 weeks, approximately 40 minutes of life)   Feeding Assessment   Nutrition Source Breastmilk   Feeding Method Nursing at the breast   Feeding Position Breast sandwich;Cross - cradle;Skin to skin;Nipple to nose;Mother needs assistance with latch/positioning   Suck/Feeding Sustained;Tactile stimulation needed;Baby led rhythmically;Coordinated suck/swallow/breathe   Latch Assessment Minimal assistance is needed;Instructed on deep latch;Eagerly grasped on to latch;Shallow latch;Deep latch obtained;Latch achieved;Optimal angle of mouth opening;Comfortable with no pain;Sucking and swallowing;Sucks with long jaw movement;Bursts of sucking, swallowing, and rest;Lower lip turned in;Flanged lips;Chin moves in rhythmic motion;Comfortable latch   LATCH Tool   Latch 2   Audible Swallowing 1   Type of Nipple 2   Comfort (Breast/Nipple) 2   Hold (Positioning) 1   LATCH Score 8   Breast Pump   Pump   (Mother has breast pump for home use.)   Patient Follow-Up   Inpatient Lactation Follow-up Needed  Yes  (as needed)   Outpatient Lactation Follow-up Recommended   Other OB Lactation Documentation    Maternal Risk Factors Previous low supply   Infant Risk Factors Early  term birth 37-39 weeks       Recommendations/Summary  34 y/o  experienced breastfeeding mother with vaginal delivery of  boy approximately 40 minutes ago. Mother states she plans to breastfeed this  for 1 year. Mother states she  her now 6 and 4 year olds for 8 months. Mother states she began supplementing her now 6 year old with formula in addition to breastfeeding due to 's weight loss. She states she supplemented with her now 4 year old as well but not right away. Mother states her second child had a tongue tie that was revised at one week old. Mother states this did not interfere with 's weight gain but did cause discomfort with latching. Mother reports +breast changes during pregnancy and denies history of breast surgery. Mother states she has a pump at home and will return to work in 6 weeks.     LC called to bedside to assist with first feed and assess mother's breastfeeding goals. Mother requesting assistance to latch . [place] showing feeding cues at this time. Reviewed positioning with mother of  belly to belly and nipple to nose and tucking  close. Mother able to do so with some assistance. Cross cradle with breast shaping reviewed. LC then had mother brush her nipple to 's upper lip and wait for  to open his mouth at a wide angle. Mother able to do so but having some difficulty bringing  to the breast when 's mouth was open wide enough. With mother's permission, LC assisted mother with bringing  to the breast to obtain a deep latch. Deep latch obtained with active sucking and swallowing, lips flanged and long jaw movements. Mother states latch is comfortable. Reviewed importance of allowing  to continue nursing at each breast until  appears satiated. Reviewed signs of satiety. Mother states understanding.     Education reviewed at this time. Reviewed milk production, normal  feeding  patterns in the first 24 hours and 's stomach capacity. Reviewed feeding cues, waking techniques and signs to know  is eating enough. Reviewed signs of a deep and comfortable latch and adequate output. Reviewed frequency of feeds and importance of feeding  every 3 hours from the beginning of the previous feed or earlier with feeding cues. Discussed importance of skin to skin. Mother states understanding.     falling asleep at this time. LC demonstrated how to unlatch  with a gloved finger. Nipple noted to be rounded. LC then provided minimal assistance to mother to obtain a deep latch to the left breast. Encouraged mother to call for ongoing assistance as needed. Mother agreeable. Mother denies further questions or concerns at this time.    2200 LC to bedside to follow up with breastfeeding progress. Mother resting.

## 2024-02-27 NOTE — LACTATION NOTE
This note was copied from a baby's chart.  Lactation Consultant Note  Lactation Consultation  Reason for Consult: Follow-up assessment  Consultant Name: Fermín Combs    Maternal Information  Has mother  before?: Yes  How long did the mother previously breastfeed?: 8 momths along with supplementation  Previous Maternal Breastfeeding Challenges: Low milk supply, Tongue tie  Infant to breast within first 2 hours of birth?: Yes  Exclusive Pump and Bottle Feed: No  WIC Program: No    Maternal Assessment  Nipple Assessment: Intact, Sore (per mother)  Alterations in Nipple Condition:  (denies pain or skin break down)    Infant Assessment  Infant Behavior:  (asleep in grandmother's arms)  Infant Assessment:  (full term infant, approximately 22 hours old, weight loss 4%, 4 voids and 2 stools since birth)    Feeding Assessment  Nutrition Source: Breastmilk  Feeding Method: Nursing at the breast  Unable to assess infant feeding at this time:  (mother verbalizes confidence with latch, infant asleep, showing no cues at this time)    LATCH TOOL       Breast Pump  Pump:  (has a personal breast pump for home use)    Other OB Lactation Tools       Patient Follow-up  Inpatient Lactation Follow-up Needed : No  Outpatient Lactation Follow-up: Recommended (encouraged due to family hx of tongue tie and suspected tongue tie with this infant, hx of low milk supply)    Other OB Lactation Documentation   Infant risk factors: tongue tie (mother has pediatric dentist that she has seen for her first 2 children), encouraged to follow-up with her Pediatrician and dentist.    Recommendations/Summary  33 year old  experienced breastfeeding mother and infant preparing for discharge. Met with mother and grandmother for lactation consult to assess breastfeeding progress, to address any questions and/or concerns and to offer lactation assistance, support and education as needed and desired prior to discharge. Mother verbalizes increased  "confidence. States latch is improving and she is able to identify shallow vs deep latch. Reports infant was sleepy after his bath last night and again this morning after circumcision. Offered support. Reviewed rousing techniques and normal feeding patterns after bath and circ. Offered to assist with feedings as needed. Mother reports feeding are occasionally \"pinchy\" for the first few sucking bursts and then feel better. Reviewed importance of deep latch. Reviewed tongue tie education. Highly encouraged follow-up soon after discharge. Reviewed milk supply education and tips to maximize and protect supply. Mother has to return to work in 6 weeks. Reviewed tips for pumping and storing milk as well as bottle introduction. Pacifier education provided.     Breastfeeding written and verbal discharge education reviewed throughout consult. Mother and grandmother provided with the opportunity to ask questions. All questions answered. See education flow sheet for detailed list of education topics covered. Reviewed importance of frequent skin to skin contact, waking techniques, infant stomach capacity, value of breast milk feeds as well as typical  feeding patterns and behaviors in the first few weeks of life. Encouraged frequent skin to skin and nursing with cues and at least 8-12 times or more per 24 hours. Reviewed signs of adequate intake/output. Reviewed resources for lactation follow-up and support after discharge. Mother and grandmother deny any further questions or concerns at this time. Verbalize confidence with breastfeeding prior to discharge. Offered ongoing breastfeeding assistance, support and education as needed and desired.    "

## 2024-02-27 NOTE — CARE PLAN
Problem: Vaginal Birth or  Section  Goal: Fetal and maternal status remain reassuring during the birth process  Outcome: Met  Goal: Tolerate CRB for IOL placement maintenance until dislodgement/removal 12hrs after placement  Outcome: Met  Goal: Prevention of malpresentation/labor dystocia through delivery  Outcome: Met  Goal: Demonstrates labor coping techniques through delivery  Outcome: Met  Goal: Minimal s/sx of HDP and BP<160/110  Outcome: Met  Goal: No s/sx of infection through recovery  Outcome: Met  Goal: No s/sx of hemorrhage through recovery  Outcome: Met     Problem: Pain - Adult  Goal: Verbalizes/displays adequate comfort level or baseline comfort level  Outcome: Met     Problem: Safety - Adult  Goal: Free from fall injury  Outcome: Met   The patient's goals for the shift include rest; discharge to home     The clinical goals for the shift include rest; maintain pain control; discharge

## 2024-02-27 NOTE — DISCHARGE SUMMARY
Discharge Summary    Admission Date: 2024  Discharge Date: 24    Discharge Diagnosis  Term pregnancy    Hospital Course  Delivery Date: 2024  12:19 PM   Delivery type: Vaginal, Spontaneous    GA at delivery: 39w0d  Outcome: Living   Anesthesia during delivery: Epidural   Intrapartum complications: None   Feeding method: Breastfeeding Status: Unknown     Patient had an uncomplicated  and postpartum course. Breast feeding. One elevated BP when arm bent.    Discharge Meds     Your medication list        CONTINUE taking these medications        Instructions Last Dose Given Next Dose Due   Calcium 500 + D 500 mg-10 mcg (400 unit) chewable tablet  Generic drug: calcium carbonate-vitamin D3           EPINEPHrine 0.3 mg/0.3 mL injection syringe  Commonly known as: Epipen           prenatal no.139-iron-folic-dha 33 mg iron- 800 mcg-350 mg combo pack                  STOP taking these medications      azithromycin 250 mg tablet  Commonly known as: Zithromax                  Complications Requiring Follow-Up  none    Test Results Pending At Discharge  Pending Labs       No current pending labs.            Outpatient Follow-Up  Future Appointments   Date Time Provider Department Center   2024 12:30 PM Salma Khan DO [id number] Vikram Rm MD

## 2024-02-27 NOTE — PROGRESS NOTES
Postpartum Progress Note    Assessment/Plan   Rosemary Tellez is a 33 y.o., , who delivered at 39w0d gestation and is now postpartum day 1.  -Breast feeding, work with lactation.  -Patient requests discharge later today.  -Follow up 6 weeks.  -One elevated BP when arm bent, no diagnosis.    Principal Problem:    Term pregnancy    Pregnancy Problems (from 23 to present)       Problem Noted Resolved    Term pregnancy 2024 by Angelica Wasserman DO No    Priority:  Medium      38 weeks gestation of pregnancy 2024 by Darcie Rm MD 2024 by Marissa Baez MD    Priority:  Medium      28 weeks gestation of pregnancy 12/15/2023 by Darcie Rm MD 2024 by Marissa Baez MD    Priority:  Medium            Hospital course: no complications    Subjective   Patient without complaints. Breast feeding.       Objective   Allergies:   Patient has no known allergies.         Last Vitals:  Temp Pulse Resp BP MAP Pulse Ox   36.9 °C (98.4 °F) 72 16 105/60   97 %     Vitals Min/Max Last 24 Hours:  Temp  Min: 36.7 °C (98.1 °F)  Max: 36.9 °C (98.4 °F)  Pulse  Min: 63  Max: 103  Resp  Min: 16  Max: 18  BP  Min: 100/65  Max: 161/76    Intake/Output:     Intake/Output Summary (Last 24 hours) at 2024 0904  Last data filed at 2024 1426  Gross per 24 hour   Intake --   Output 245 ml   Net -245 ml       Physical Exam:  General: Examination reveals a well developed, well nourished, female, in no acute distress. She is alert and cooperative.  Psychological: awake and alert; oriented to person, place, and time.  Abdomen: soft, non tender, non distended. Fundus firm, non tender, below umbilicus.  Ext: non tender, no edema.    Lab Data:  Lab Results   Component Value Date    WBC 6.6 2024    HGB 11.5 (L) 2024    HCT 34.6 (L) 2024     2024

## 2024-03-01 ENCOUNTER — APPOINTMENT (OUTPATIENT)
Dept: OBSTETRICS AND GYNECOLOGY | Facility: CLINIC | Age: 34
End: 2024-03-01
Payer: COMMERCIAL

## 2024-04-05 ENCOUNTER — POSTPARTUM VISIT (OUTPATIENT)
Dept: OBSTETRICS AND GYNECOLOGY | Facility: CLINIC | Age: 34
End: 2024-04-05
Payer: COMMERCIAL

## 2024-04-05 VITALS — WEIGHT: 173 LBS | DIASTOLIC BLOOD PRESSURE: 60 MMHG | SYSTOLIC BLOOD PRESSURE: 110 MMHG | BODY MASS INDEX: 25.55 KG/M2

## 2024-04-05 PROCEDURE — 0503F POSTPARTUM CARE VISIT: CPT | Performed by: OBSTETRICS & GYNECOLOGY

## 2024-04-05 ASSESSMENT — EDINBURGH POSTNATAL DEPRESSION SCALE (EPDS)
I HAVE BEEN SO UNHAPPY THAT I HAVE BEEN CRYING: NO, NEVER
I HAVE BEEN ABLE TO LAUGH AND SEE THE FUNNY SIDE OF THINGS: AS MUCH AS I ALWAYS COULD
I HAVE LOOKED FORWARD WITH ENJOYMENT TO THINGS: AS MUCH AS I EVER DID
I HAVE FELT SCARED OR PANICKY FOR NO GOOD REASON: NO, NOT AT ALL
THINGS HAVE BEEN GETTING ON TOP OF ME: NO, MOST OF THE TIME I HAVE COPED QUITE WELL
THE THOUGHT OF HARMING MYSELF HAS OCCURRED TO ME: NEVER
I HAVE BEEN SO UNHAPPY THAT I HAVE HAD DIFFICULTY SLEEPING: NOT AT ALL
TOTAL SCORE: 1
I HAVE BLAMED MYSELF UNNECESSARILY WHEN THINGS WENT WRONG: NO, NEVER
I HAVE BEEN ANXIOUS OR WORRIED FOR NO GOOD REASON: NO, NOT AT ALL
I HAVE FELT SAD OR MISERABLE: NO, NOT AT ALL

## 2024-04-05 NOTE — PROGRESS NOTES
Pap 2020 NEG HPV NEG    ASSESSMENT/PLAN    Encounter for postpartum visit  Normal postpartum visit.   Check insurance coverage for Mirena IUD. RTO for insertion.  No pap done. Last pap  was normal and HPV negative. Next pap due .     SUBJECTIVE    HPI    34 yo G s/p  2024 presents for postpartum visit.   Interested in Mirena IUD for contraception. Has had in the past.  Has urinary incontinence. Discussed postpartum vaginal relaxation that should improve.   [facility] d/t family history of breast CA, mother, 3 maternal aunts. Last mamm 3/2023.       OBJECTIVE    /60   Wt 78.5 kg (173 lb)   LMP 2023   Breastfeeding Yes   BMI 25.55 kg/m²      Physical Exam     Constitutional: Alert and in no acute distress. Well developed, well nourished   Head and Face: Head and face: normal   Eyes: Normal external exam - nonicteric sclera, extraocular movements intact (EOMI) and no ptosis.   Ears, Nose, Mouth, and Throat: External inspection of ears and nose: normal   Neck: no neck asymmetry. Supple and thyroid not enlarged and there were no palpable thyroid nodules   Cardiovascular: Heart rate and rhythm were normal, normal S1 and S2, no gallops, and no murmurs   Pulmonary: No respiratory distress and clear bilateral breath sounds   Chest: Breasts: normal appearance, no skin changes, palpation of breasts and axillae: no palpable mass and no axillary lymphadenopathy   Abdomen: soft nontender; no abdominal mass palpated, no organomegaly and no hernias   Genitourinary: external genitalia: normal, no inguinal lymphadenopathy, Bartholin's urethral and Wiley Ford's glands: normal, urethra: normal, bladder: normal on palpation and perianal area: normal   Vagina: normal.   Cervix: Normal appearing without lesions.  Uterus: Normal, mobile, nontender.  Right Adnexa/parametria: Normal.   Left Adnexa/parametria: Normal.   Skin: normal skin color and pigmentation, normal skin turgor, and  no rash.   Psychiatric: alert and oriented x 3., affect normal to patient baseline and mood: appropriate          Marissa Baez MD

## 2024-04-16 ENCOUNTER — TELEPHONE (OUTPATIENT)
Dept: OBSTETRICS AND GYNECOLOGY | Facility: CLINIC | Age: 34
End: 2024-04-16
Payer: COMMERCIAL

## 2024-05-02 ENCOUNTER — APPOINTMENT (OUTPATIENT)
Dept: OBSTETRICS AND GYNECOLOGY | Facility: CLINIC | Age: 34
End: 2024-05-02
Payer: COMMERCIAL

## 2024-05-02 ENCOUNTER — PROCEDURE VISIT (OUTPATIENT)
Dept: OBSTETRICS AND GYNECOLOGY | Facility: CLINIC | Age: 34
End: 2024-05-02
Payer: COMMERCIAL

## 2024-05-02 VITALS — SYSTOLIC BLOOD PRESSURE: 116 MMHG | WEIGHT: 169.8 LBS | BODY MASS INDEX: 25.08 KG/M2 | DIASTOLIC BLOOD PRESSURE: 60 MMHG

## 2024-05-02 DIAGNOSIS — Z30.430 ENCOUNTER FOR IUD INSERTION: ICD-10-CM

## 2024-05-02 DIAGNOSIS — Z32.02 PREGNANCY TEST NEGATIVE: ICD-10-CM

## 2024-05-02 LAB — PREGNANCY TEST URINE, POC: NEGATIVE

## 2024-05-02 PROCEDURE — 81025 URINE PREGNANCY TEST: CPT | Performed by: OBSTETRICS & GYNECOLOGY

## 2024-05-02 PROCEDURE — 58300 INSERT INTRAUTERINE DEVICE: CPT | Performed by: OBSTETRICS & GYNECOLOGY

## 2024-05-02 RX ORDER — LEVONORGESTREL 52 MG/1
1 INTRAUTERINE DEVICE INTRAUTERINE ONCE
COMMUNITY

## 2024-05-02 NOTE — PATIENT INSTRUCTIONS
IUD Insertion Visit:  You were seen in the office today for a Mirena IUD insertion  An ultrasound was done in office today that confirmed correct positioning of your IUD in the uterine cavity  It is common to have vaginal bleeding and cramping following insertion. You can use Tylenol, Ibuprofen, or Naproxen to alleviate cramping. Bleeding can taper over several weeks.  If your IUD was placed during a menstrual period you can consider it immediately effective against pregnancy. If it was not placed during menstrual period it can take 2-3 weeks to be effective, and condoms should be used to protect against pregnancy during that time  IUD's protect against pregnancy but do not protect against STD's. Please remember to use condoms with any new partners and be checked for STD's if you are having vaginal symptoms or have had a new partner within the last year  You may choose to (but do not have to) check for your IUD strings by placing 1-2 clean finger into the vagina. Be careful not to pull/tug the strings as this can displace the IUD. Be careful when using menstrual cups or other similar products, or switch to pads/tampons, as vaginal collection devices can pull on the IUD strings during insertion/removal  If at any time you have symptoms of your IUD being displaced (new heavy bleeding, pelvic cramping, visualization/sensation of IUD strings at the vaginal opening), you should take a pregnancy test, call the office for an appointment, and use condoms to prevent pregnancy until another form of contraception is initiated. If your IUD falls out, rinse it off and bring it to the office in a plastic bag because the company that manufactures the device may be able to replace the device for free.  Make a follow up appointment in the office for 4-6 weeks. Continue to follow up annually for routine gynecologic exams  Call the office if you are having any problems with your IUD or if you have any questions regarding IUD use.  [phone] ([place]) or [phone] ([place])

## 2024-05-02 NOTE — PROGRESS NOTES
Imp/Plan    Mirena IUD placed.  First IUD from [facility] did not appropriately deploy. Used Mirena from office supply. Will send IUD to [employer] for reimbursement.  RTO one month IUD check.       33 yo  for Mirena IUD insertion.  S/p  2024.  Breast feeding. Had menses last month.      IUD Insertion    Date/Time: 2024 1:50 PM    Performed by: Marissa Baez MD  Authorized by: Marissa Baez MD    Consent:     Consent obtained:  Written    Consent given by:  Patient    Procedure risks and benefits discussed: yes      Patient questions answered: yes      Patient agrees, verbalizes understanding, and wants to proceed: yes      Educational handouts given: yes      Instructions and paperwork completed: yes    Procedure:     Negative urine pregnancy test: yes      Cervix cleaned and prepped: yes      Speculum placed in vagina: yes      Tenaculum applied to cervix: yes      Uterus sounded: yes      Uterus sound depth (cm):  7.5    IUD inserted with no complications: no (First IUD did not deploy completely and was not properly placed.)      IUD type:  Mirena  Comments:      Placement of first IUD, Mirena would not completely deploy, got stuck in device and would not properly deploy.  Used IUD from office stock  Transabdominal ultrasound after IUD placement shows intrauterine IUD.

## 2024-05-07 ENCOUNTER — APPOINTMENT (OUTPATIENT)
Dept: OBSTETRICS AND GYNECOLOGY | Facility: CLINIC | Age: 34
End: 2024-05-07
Payer: COMMERCIAL

## 2024-05-31 ENCOUNTER — OFFICE VISIT (OUTPATIENT)
Dept: OBSTETRICS AND GYNECOLOGY | Facility: CLINIC | Age: 34
End: 2024-05-31
Payer: COMMERCIAL

## 2024-05-31 VITALS — DIASTOLIC BLOOD PRESSURE: 70 MMHG | BODY MASS INDEX: 25.02 KG/M2 | WEIGHT: 169.4 LBS | SYSTOLIC BLOOD PRESSURE: 110 MMHG

## 2024-05-31 DIAGNOSIS — Z30.431 IUD CHECK UP: Primary | ICD-10-CM

## 2024-05-31 PROBLEM — Z34.90 TERM PREGNANCY (HHS-HCC): Status: RESOLVED | Noted: 2024-02-25 | Resolved: 2024-05-31

## 2024-05-31 PROBLEM — O35.EXX0 FETAL RENAL ANOMALY, SINGLE GESTATION (HHS-HCC): Status: RESOLVED | Noted: 2023-10-16 | Resolved: 2024-05-31

## 2024-05-31 PROCEDURE — 1036F TOBACCO NON-USER: CPT | Performed by: OBSTETRICS & GYNECOLOGY

## 2024-05-31 PROCEDURE — 99212 OFFICE O/P EST SF 10 MIN: CPT | Performed by: OBSTETRICS & GYNECOLOGY

## 2024-05-31 NOTE — PROGRESS NOTES
ASSESSMENT/PLAN  IUD check up  RTO one year/prn    SUBJECTIVE    HPI    35 yo  s/p Mirena IUD insertion 2024.   Had light cycle post insertion.   Occasional spotting since.   Doing well.    OBJECTIVE    /70   Wt 76.8 kg (169 lb 6.4 oz)   LMP 2024 (Approximate)   BMI 25.02 kg/m²     Physical Exam     Constitutional: Alert and in no acute distress. Well developed, well nourished   Genitourinary: external genitalia: normal,  perianal area: normal   Vagina: normal.   Cervix: Normal appearing without lesions. Strings at cervical os.  Psychiatric: alert and oriented x 3., affect normal to patient baseline and mood: appropriate       Marissa Baez MD

## 2024-07-05 ENCOUNTER — APPOINTMENT (OUTPATIENT)
Dept: PRIMARY CARE | Facility: CLINIC | Age: 34
End: 2024-07-05
Payer: COMMERCIAL

## 2024-07-05 VITALS
BODY MASS INDEX: 25.49 KG/M2 | OXYGEN SATURATION: 98 % | HEART RATE: 58 BPM | HEIGHT: 68 IN | SYSTOLIC BLOOD PRESSURE: 100 MMHG | WEIGHT: 168.2 LBS | TEMPERATURE: 97.8 F | DIASTOLIC BLOOD PRESSURE: 64 MMHG

## 2024-07-05 DIAGNOSIS — Z00.00 HEALTH MAINTENANCE EXAMINATION: Primary | ICD-10-CM

## 2024-07-05 PROCEDURE — 1036F TOBACCO NON-USER: CPT | Performed by: STUDENT IN AN ORGANIZED HEALTH CARE EDUCATION/TRAINING PROGRAM

## 2024-07-05 PROCEDURE — 99395 PREV VISIT EST AGE 18-39: CPT | Performed by: STUDENT IN AN ORGANIZED HEALTH CARE EDUCATION/TRAINING PROGRAM

## 2024-07-05 ASSESSMENT — ENCOUNTER SYMPTOMS
HEADACHES: 0
FEVER: 0
COUGH: 0
FATIGUE: 0
SHORTNESS OF BREATH: 0
PALPITATIONS: 0
ABDOMINAL PAIN: 0
WHEEZING: 0
NAUSEA: 0
DYSPHORIC MOOD: 0
DIARRHEA: 0
NERVOUS/ANXIOUS: 0
DIZZINESS: 0
CONSTIPATION: 0
DYSURIA: 0
CHILLS: 0
NUMBNESS: 0
HEMATURIA: 0
FREQUENCY: 0

## 2024-07-05 ASSESSMENT — PATIENT HEALTH QUESTIONNAIRE - PHQ9
1. LITTLE INTEREST OR PLEASURE IN DOING THINGS: NOT AT ALL
2. FEELING DOWN, DEPRESSED OR HOPELESS: NOT AT ALL
SUM OF ALL RESPONSES TO PHQ9 QUESTIONS 1 AND 2: 0

## 2024-07-05 NOTE — PROGRESS NOTES
"Rosemary Tellez  presents for her annual wellness exam.    HPI  Specialists seen by patient: GYN  -dentist  -eye doctor  -derm  -breast specialist    Last pap/cervical cancer screenin  Last mammogram:  alternates mammo and MRI per breast specialist  Hx of colon ca screening:  n/a  Hx of DXA: n/a  LMP/menstrual cycles: none since IUD  Contraception: Mirena  Menopause: n/a  Immunizations: up to date  Diet: Follows a healthy diet  Exercise: Gets regular exercise, exercise bootcamp and walking   Alcohol abuse screen:   rare    How many times in the past year 4 or more drinks in a day? 0  Lung cancer screening:   Smoking history: never a smoker  Drug use: No      Review of Systems   Constitutional:  Negative for chills, fatigue and fever.   Respiratory:  Negative for cough, shortness of breath and wheezing.    Cardiovascular:  Negative for chest pain, palpitations and leg swelling.   Gastrointestinal:  Negative for abdominal pain, constipation, diarrhea and nausea.   Genitourinary:  Negative for dysuria, frequency, hematuria and urgency.   Neurological:  Negative for dizziness, numbness and headaches.   Psychiatric/Behavioral:  Negative for dysphoric mood. The patient is not nervous/anxious.           Objective    /64   Pulse 58   Temp 36.6 °C (97.8 °F)   Ht 1.727 m (5' 8\")   Wt 76.3 kg (168 lb 3.2 oz)   SpO2 98%   BMI 25.57 kg/m²     Physical Exam  Constitutional:       General: She is not in acute distress.     Appearance: Normal appearance.   HENT:      Head: Normocephalic and atraumatic.      Right Ear: Tympanic membrane and ear canal normal.      Left Ear: Tympanic membrane and ear canal normal.      Mouth/Throat:      Mouth: Mucous membranes are moist.      Pharynx: No posterior oropharyngeal erythema.   Eyes:      Extraocular Movements: Extraocular movements intact.      Pupils: Pupils are equal, round, and reactive to light.   Cardiovascular:      Rate and Rhythm: Normal rate and regular rhythm. "      Heart sounds: No murmur heard.  Pulmonary:      Effort: Pulmonary effort is normal. No respiratory distress.      Breath sounds: Normal breath sounds. No wheezing.   Abdominal:      General: Bowel sounds are normal.      Palpations: Abdomen is soft.      Tenderness: There is no abdominal tenderness. There is no guarding.   Musculoskeletal:         General: Normal range of motion.      Cervical back: Neck supple.   Skin:     General: Skin is warm and dry.   Neurological:      General: No focal deficit present.      Mental Status: She is alert and oriented to person, place, and time.   Psychiatric:         Mood and Affect: Mood normal.         Behavior: Behavior normal.            Problem List Items Addressed This Visit    None  Visit Diagnoses       Health maintenance examination    -  Primary    Relevant Orders    Lipid Panel    Basic Metabolic Panel    CBC             We will obtain fasting blood work.  Results will be communicated to the patient via MyChart, phone call, or a letter.   We reviewed appropriate preventative health screening guidelines.  We discussed regular aerobic exercise. We discussed proper nutrition and weight control.      Salma Khan, DO  07/05/24

## 2024-07-12 ENCOUNTER — LAB (OUTPATIENT)
Dept: LAB | Facility: LAB | Age: 34
End: 2024-07-12
Payer: COMMERCIAL

## 2024-07-12 DIAGNOSIS — Z00.00 HEALTH MAINTENANCE EXAMINATION: ICD-10-CM

## 2024-07-12 LAB
ANION GAP SERPL CALC-SCNC: 11 MMOL/L (ref 10–20)
BUN SERPL-MCNC: 12 MG/DL (ref 6–23)
CALCIUM SERPL-MCNC: 9 MG/DL (ref 8.6–10.3)
CHLORIDE SERPL-SCNC: 106 MMOL/L (ref 98–107)
CHOLEST SERPL-MCNC: 130 MG/DL (ref 0–199)
CHOLESTEROL/HDL RATIO: 2
CO2 SERPL-SCNC: 26 MMOL/L (ref 21–32)
CREAT SERPL-MCNC: 0.71 MG/DL (ref 0.5–1.05)
EGFRCR SERPLBLD CKD-EPI 2021: >90 ML/MIN/1.73M*2
ERYTHROCYTE [DISTWIDTH] IN BLOOD BY AUTOMATED COUNT: 12 % (ref 11.5–14.5)
GLUCOSE SERPL-MCNC: 82 MG/DL (ref 74–99)
HCT VFR BLD AUTO: 38.7 % (ref 36–46)
HDLC SERPL-MCNC: 64.1 MG/DL
HGB BLD-MCNC: 12.7 G/DL (ref 12–16)
LDLC SERPL CALC-MCNC: 56 MG/DL
MCH RBC QN AUTO: 29.6 PG (ref 26–34)
MCHC RBC AUTO-ENTMCNC: 32.8 G/DL (ref 32–36)
MCV RBC AUTO: 90 FL (ref 80–100)
NON HDL CHOLESTEROL: 66 MG/DL (ref 0–149)
NRBC BLD-RTO: 0 /100 WBCS (ref 0–0)
PLATELET # BLD AUTO: 216 X10*3/UL (ref 150–450)
POTASSIUM SERPL-SCNC: 4.2 MMOL/L (ref 3.5–5.3)
RBC # BLD AUTO: 4.29 X10*6/UL (ref 4–5.2)
SODIUM SERPL-SCNC: 139 MMOL/L (ref 136–145)
TRIGL SERPL-MCNC: 48 MG/DL (ref 0–149)
VLDL: 10 MG/DL (ref 0–40)
WBC # BLD AUTO: 3.8 X10*3/UL (ref 4.4–11.3)

## 2024-07-12 PROCEDURE — 80048 BASIC METABOLIC PNL TOTAL CA: CPT

## 2024-07-12 PROCEDURE — 85027 COMPLETE CBC AUTOMATED: CPT

## 2024-07-12 PROCEDURE — 36415 COLL VENOUS BLD VENIPUNCTURE: CPT

## 2024-07-12 PROCEDURE — 80061 LIPID PANEL: CPT

## 2024-07-19 DIAGNOSIS — D72.819 LEUKOPENIA, UNSPECIFIED TYPE: ICD-10-CM

## 2024-11-13 ENCOUNTER — OFFICE VISIT (OUTPATIENT)
Dept: OBSTETRICS AND GYNECOLOGY | Facility: CLINIC | Age: 34
End: 2024-11-13
Payer: COMMERCIAL

## 2024-11-13 VITALS
HEIGHT: 68 IN | DIASTOLIC BLOOD PRESSURE: 70 MMHG | BODY MASS INDEX: 25.91 KG/M2 | SYSTOLIC BLOOD PRESSURE: 110 MMHG | WEIGHT: 171 LBS

## 2024-11-13 DIAGNOSIS — N61.0 MASTITIS: Primary | ICD-10-CM

## 2024-11-13 PROCEDURE — 3008F BODY MASS INDEX DOCD: CPT | Performed by: OBSTETRICS & GYNECOLOGY

## 2024-11-13 PROCEDURE — 1036F TOBACCO NON-USER: CPT | Performed by: OBSTETRICS & GYNECOLOGY

## 2024-11-13 PROCEDURE — 99213 OFFICE O/P EST LOW 20 MIN: CPT | Performed by: OBSTETRICS & GYNECOLOGY

## 2024-11-13 RX ORDER — DICLOXACILLIN SODIUM 500 MG/1
500 CAPSULE ORAL 4 TIMES DAILY
Qty: 28 CAPSULE | Refills: 0 | Status: SHIPPED | OUTPATIENT
Start: 2024-11-13 | End: 2024-11-20

## 2024-11-13 NOTE — PROGRESS NOTES
"  ASSESSMENT/PLAN  1. Mastitis (Primary)  Recommend cold compresses, snug fitting sports bra, don't stimulate breasts.  Start antibiotic rx sent to the pharmacy.  - dicloxacillin (Dynapen) 500 mg capsule; Take 1 capsule (500 mg) by mouth 4 times a day for 7 days.  Dispense: 28 capsule; Refill: 0     SUBJECTIVE    HPI    33 yo stopped breast feeding and pumping two weeks ago with c/o tender spot of left breast, lumpy area.   S/p delivery 2/2024.     ROS    Constitutional: no fever, no chills, no recent weight gain, no recent weight loss, no fatigue.   Integumentary: no rashes, no skin lesions, + breast pain, no nipple discharge, no breast lump.       OBJECTIVE    /70   Ht 1.734 m (5' 8.25\")   Wt 77.6 kg (171 lb)   Breastfeeding No   BMI 25.81 kg/m²      Physical Exam     Constitutional: Alert and in no acute distress. Well developed, well nourished   Chest:Left breast outer quadrant is a 4 x 4 cm area of tenderness, fullness, no distinct palpable masses, faint erythema. Right breast appears normal.  Skin: normal skin color and pigmentation, normal skin turgor, and no rash.   Psychiatric: alert and oriented x 3., affect normal to patient baseline and mood: appropriate        Marissa Baez MD   "

## 2024-11-24 ENCOUNTER — OFFICE VISIT (OUTPATIENT)
Dept: URGENT CARE | Age: 34
End: 2024-11-24
Payer: COMMERCIAL

## 2024-11-24 VITALS
SYSTOLIC BLOOD PRESSURE: 116 MMHG | RESPIRATION RATE: 18 BRPM | HEART RATE: 73 BPM | OXYGEN SATURATION: 99 % | DIASTOLIC BLOOD PRESSURE: 77 MMHG

## 2024-11-24 DIAGNOSIS — N30.01 ACUTE CYSTITIS WITH HEMATURIA: ICD-10-CM

## 2024-11-24 DIAGNOSIS — R30.0 DYSURIA: Primary | ICD-10-CM

## 2024-11-24 DIAGNOSIS — R39.15 URINARY URGENCY: ICD-10-CM

## 2024-11-24 DIAGNOSIS — R35.0 URINARY FREQUENCY: ICD-10-CM

## 2024-11-24 LAB
POC APPEARANCE, URINE: ABNORMAL
POC BILIRUBIN, URINE: NEGATIVE
POC BLOOD, URINE: ABNORMAL
POC COLOR, URINE: ABNORMAL
POC GLUCOSE, URINE: NEGATIVE MG/DL
POC KETONES, URINE: NEGATIVE MG/DL
POC LEUKOCYTES, URINE: ABNORMAL
POC NITRITE,URINE: NEGATIVE
POC PH, URINE: 6 PH
POC PROTEIN, URINE: ABNORMAL MG/DL
POC SPECIFIC GRAVITY, URINE: 1.02
POC UROBILINOGEN, URINE: 0.2 EU/DL
PREGNANCY TEST URINE, POC: NEGATIVE

## 2024-11-24 PROCEDURE — 87186 SC STD MICRODIL/AGAR DIL: CPT

## 2024-11-24 PROCEDURE — 87086 URINE CULTURE/COLONY COUNT: CPT

## 2024-11-24 RX ORDER — NITROFURANTOIN 25; 75 MG/1; MG/1
100 CAPSULE ORAL 2 TIMES DAILY
Qty: 14 CAPSULE | Refills: 0 | Status: SHIPPED | OUTPATIENT
Start: 2024-11-24 | End: 2024-12-01

## 2024-11-24 ASSESSMENT — ENCOUNTER SYMPTOMS
FLANK PAIN: 1
GASTROINTESTINAL NEGATIVE: 1
FREQUENCY: 0
DYSURIA: 0
CONSTITUTIONAL NEGATIVE: 1
HEMATURIA: 1

## 2024-11-24 NOTE — PROGRESS NOTES
Subjective   Patient ID: Rosemary Tellez is a 34 y.o. female. They present today with a chief complaint of Urinary Urgency and burning when urinating ( Symptoms started today).    History of Present Illness  Sudden onset of hematuria, urgency, burning with urination 1 hour prior to presenting to .       History provided by:  Patient   used: No        Past Medical History  Allergies as of 11/24/2024    (No Known Allergies)       (Not in a hospital admission)       Past Medical History:   Diagnosis Date    ASCUS with positive high risk HPV cervical     Migraine     Other conditions influencing health status     History of vaginal delivery    Other conditions influencing health status 01/16/2015    ASCUS with positive high risk HPV    Personal history of other diseases of the nervous system and sense organs     History of migraine       Past Surgical History:   Procedure Laterality Date    MOUTH SURGERY  12/12/2014    Oral Surgery Tooth Extraction    OTHER SURGICAL HISTORY  05/05/2017    Corneal LASIK Bilateral        reports that she has never smoked. She has never been exposed to tobacco smoke. She has never used smokeless tobacco. She reports that she does not currently use alcohol. She reports that she does not use drugs.    Review of Systems  Review of Systems   Constitutional: Negative.    Gastrointestinal: Negative.    Genitourinary:  Positive for flank pain, hematuria and urgency. Negative for dysuria, frequency and pelvic pain.                                  Objective    Vitals:    11/24/24 1542   BP: 116/77   Pulse: 73   Resp: 18   SpO2: 99%     No LMP recorded. Patient has had an implant.    Physical Exam  Vitals and nursing note reviewed.   Constitutional:       Appearance: Normal appearance. She is normal weight.      Comments: Pleasant, cooperative, NAD   HENT:      Head: Normocephalic and atraumatic.      Mouth/Throat:      Mouth: Mucous membranes are moist.   Cardiovascular:       Rate and Rhythm: Normal rate.   Pulmonary:      Effort: Pulmonary effort is normal. No respiratory distress.   Abdominal:      General: Abdomen is flat. There is no distension.      Palpations: Abdomen is soft.      Tenderness: There is no abdominal tenderness. There is no right CVA tenderness or left CVA tenderness.   Skin:     General: Skin is warm and dry.   Neurological:      General: No focal deficit present.      Mental Status: She is alert and oriented to person, place, and time.   Psychiatric:         Mood and Affect: Mood normal.         Behavior: Behavior normal.         Procedures    Point of Care Test & Imaging Results from this visit  Results for orders placed or performed in visit on 11/24/24   POCT UA Automated manually resulted   Result Value Ref Range    POC Color, Urine Orange (A) Straw, Yellow, Light-Yellow    POC Appearance, Urine Turbid (A) Clear    POC Glucose, Urine NEGATIVE NEGATIVE mg/dl    POC Bilirubin, Urine NEGATIVE NEGATIVE    POC Ketones, Urine NEGATIVE NEGATIVE mg/dl    POC Specific Gravity, Urine 1.020 1.005 - 1.035    POC Blood, Urine LARGE (3+) (A) NEGATIVE    POC PH, Urine 6.0 No Reference Range Established PH    POC Protein, Urine 100 (2+) (A) NEGATIVE, 30 (1+) mg/dl    POC Urobilinogen, Urine 0.2 0.2, 1.0 EU/DL    Poc Nitrite, Urine NEGATIVE NEGATIVE    POC Leukocytes, Urine TRACE (A) NEGATIVE   POCT pregnancy, urine manually resulted   Result Value Ref Range    Preg Test, Ur Negative Negative      No results found.    Diagnostic study results (if any) were reviewed by Arianna Bermudez PA-C.    Assessment/Plan   Allergies, medications, history, and pertinent labs/EKGs/Imaging reviewed by Arianna Bermudez PA-C.     Medical Decision Making  History and exam consistent with UTI.  She's never had UTI in the past, but symptoms and urine testing here in clinic are indicative of UTI.  She is not allergic to meds, she is not breast feeding, just stopped due to mastitis.   +sexually active a few hours prior to onset of symptoms.  Will treat with macrobid and send culture.  Follow up with PCP if needed.  To ER if worse, fevers, chills, abd or flank pain, N-V.     Orders and Diagnoses  Diagnoses and all orders for this visit:  Dysuria  -     POCT UA Automated manually resulted  -     POCT pregnancy, urine manually resulted  -     Urine Culture  Acute cystitis with hematuria  Urinary frequency  Urinary urgency      Medical Admin Record      Patient disposition: Home    Electronically signed by Arianna Bermudez PA-C  4:03 PM

## 2024-11-25 ENCOUNTER — TELEPHONE (OUTPATIENT)
Dept: OBSTETRICS AND GYNECOLOGY | Facility: CLINIC | Age: 34
End: 2024-11-25
Payer: COMMERCIAL

## 2024-11-25 NOTE — TELEPHONE ENCOUNTER
TC with pt. Seen for UTI at urgent care yesterday. On macrobid, urine culture pending. Notes still blood in urine with some burning with urination. Await urine cx.

## 2024-11-28 LAB — BACTERIA UR CULT: ABNORMAL

## 2025-02-05 ENCOUNTER — OFFICE VISIT (OUTPATIENT)
Dept: PRIMARY CARE | Facility: CLINIC | Age: 35
End: 2025-02-05
Payer: COMMERCIAL

## 2025-02-05 VITALS
OXYGEN SATURATION: 99 % | DIASTOLIC BLOOD PRESSURE: 60 MMHG | HEIGHT: 69 IN | BODY MASS INDEX: 24.65 KG/M2 | TEMPERATURE: 97.8 F | SYSTOLIC BLOOD PRESSURE: 110 MMHG | HEART RATE: 77 BPM | WEIGHT: 166.4 LBS

## 2025-02-05 DIAGNOSIS — R22.1 LOCALIZED SWELLING, MASS AND LUMP, NECK: Primary | ICD-10-CM

## 2025-02-05 DIAGNOSIS — J02.0 STREP PHARYNGITIS: ICD-10-CM

## 2025-02-05 PROCEDURE — 1036F TOBACCO NON-USER: CPT | Performed by: STUDENT IN AN ORGANIZED HEALTH CARE EDUCATION/TRAINING PROGRAM

## 2025-02-05 PROCEDURE — 99214 OFFICE O/P EST MOD 30 MIN: CPT | Performed by: STUDENT IN AN ORGANIZED HEALTH CARE EDUCATION/TRAINING PROGRAM

## 2025-02-05 PROCEDURE — 3008F BODY MASS INDEX DOCD: CPT | Performed by: STUDENT IN AN ORGANIZED HEALTH CARE EDUCATION/TRAINING PROGRAM

## 2025-02-05 RX ORDER — AMOXICILLIN 500 MG/1
500 CAPSULE ORAL 2 TIMES DAILY
COMMUNITY
Start: 2025-01-28 | End: 2025-02-07

## 2025-02-05 ASSESSMENT — ENCOUNTER SYMPTOMS
HEMATURIA: 0
DEPRESSION: 0
NERVOUS/ANXIOUS: 0
OCCASIONAL FEELINGS OF UNSTEADINESS: 0
WHEEZING: 0
FEVER: 0
DYSPHORIC MOOD: 0
CONSTIPATION: 0
DIARRHEA: 0
PALPITATIONS: 0
NAUSEA: 0
DYSURIA: 0
HEADACHES: 0
FREQUENCY: 0
ABDOMINAL PAIN: 0
COUGH: 0
DIZZINESS: 0
SHORTNESS OF BREATH: 0
CHILLS: 0
FATIGUE: 0
NUMBNESS: 0

## 2025-02-05 ASSESSMENT — PATIENT HEALTH QUESTIONNAIRE - PHQ9
1. LITTLE INTEREST OR PLEASURE IN DOING THINGS: NOT AT ALL
SUM OF ALL RESPONSES TO PHQ9 QUESTIONS 1 AND 2: 0
2. FEELING DOWN, DEPRESSED OR HOPELESS: NOT AT ALL

## 2025-02-05 NOTE — PROGRESS NOTES
"Subjective   Patient ID: Rosemary Tellez is a 34 y.o. female who presents for Follow-up (Urgent care visit, tested Positive for Strep Throat while out of town/Lump noted in neck still).    HPI   Patient here to follow-up on urgent care visit when she was out of town.  On 1/20/2025 she went to an urgent care for sore throat and lump on the L side of her neck and was diagnosed with strep.  She was given amoxicillin 500 mg twice daily for 10 days as well as a Medrol dose pack.  She still has a lump on the L side of her neck. She noticed this last Tuesday. It hasn't changed since that time. Sore throat has improved. No difficulty swallowing. No shortness of breath. No fever or chills.     Review of Systems   Constitutional:  Negative for chills, fatigue and fever.   Respiratory:  Negative for cough, shortness of breath and wheezing.    Cardiovascular:  Negative for chest pain, palpitations and leg swelling.   Gastrointestinal:  Negative for abdominal pain, constipation, diarrhea and nausea.   Genitourinary:  Negative for dysuria, frequency, hematuria and urgency.   Neurological:  Negative for dizziness, numbness and headaches.   Psychiatric/Behavioral:  Negative for dysphoric mood. The patient is not nervous/anxious.        Objective   /60 (BP Location: Left arm, Patient Position: Sitting, BP Cuff Size: Adult)   Pulse 77   Temp 36.6 °C (97.8 °F) (Temporal)   Ht 1.745 m (5' 8.7\")   Wt 75.5 kg (166 lb 6.4 oz)   SpO2 99%   BMI 24.79 kg/m²     Physical Exam  Constitutional:       Appearance: Normal appearance.   HENT:      Head: Normocephalic and atraumatic.      Mouth/Throat:      Pharynx: Posterior oropharyngeal erythema present. No oropharyngeal exudate.   Eyes:      Extraocular Movements: Extraocular movements intact.      Pupils: Pupils are equal, round, and reactive to light.   Neck:        Comments: Approximately 2 cm mass left anterior neck  Cardiovascular:      Rate and Rhythm: Normal rate and regular " rhythm.      Heart sounds: Normal heart sounds. No murmur heard.  Pulmonary:      Effort: Pulmonary effort is normal.      Breath sounds: Normal breath sounds. No wheezing.   Musculoskeletal:         General: Normal range of motion.   Skin:     General: Skin is warm and dry.   Neurological:      General: No focal deficit present.      Mental Status: She is alert and oriented to person, place, and time.   Psychiatric:         Mood and Affect: Mood normal.         Behavior: Behavior normal.         Assessment/Plan   Problem List Items Addressed This Visit    None  Visit Diagnoses       Localized swelling, mass and lump, neck    -  Primary    Relevant Orders    US neck    Strep pharyngitis              Will check an ultrasound of the lump on her neck.  She is going to continue her amoxicillin until she is done with the medications.  She is feeling overall better.    Salma Khan, DO  2/5/2025

## 2025-02-06 ENCOUNTER — HOSPITAL ENCOUNTER (OUTPATIENT)
Dept: RADIOLOGY | Facility: CLINIC | Age: 35
Discharge: HOME | End: 2025-02-06
Payer: COMMERCIAL

## 2025-02-06 DIAGNOSIS — R22.1 LOCALIZED SWELLING, MASS AND LUMP, NECK: ICD-10-CM

## 2025-02-06 PROCEDURE — 76536 US EXAM OF HEAD AND NECK: CPT

## 2025-02-07 ENCOUNTER — APPOINTMENT (OUTPATIENT)
Dept: RADIOLOGY | Facility: CLINIC | Age: 35
End: 2025-02-07
Payer: COMMERCIAL

## 2025-02-10 DIAGNOSIS — E04.1 THYROID NODULE: Primary | ICD-10-CM

## 2025-02-12 LAB
T4 FREE SERPL-MCNC: 2 NG/DL (ref 0.8–1.8)
TSH SERPL-ACNC: 0.02 MIU/L

## 2025-02-14 ENCOUNTER — TELEPHONE (OUTPATIENT)
Dept: PRIMARY CARE | Facility: CLINIC | Age: 35
End: 2025-02-14
Payer: COMMERCIAL

## 2025-02-17 DIAGNOSIS — R51.9 PERSISTENT HEADACHES: ICD-10-CM

## 2025-02-17 DIAGNOSIS — E04.1 THYROID NODULE: ICD-10-CM

## 2025-02-17 DIAGNOSIS — E05.90 HYPERTHYROIDISM: Primary | ICD-10-CM

## 2025-02-17 RX ORDER — PROPRANOLOL HYDROCHLORIDE 20 MG/1
20 TABLET ORAL 2 TIMES DAILY
Qty: 60 TABLET | Refills: 5 | Status: SHIPPED | OUTPATIENT
Start: 2025-02-17 | End: 2025-08-16

## 2025-02-17 NOTE — PROGRESS NOTES
"Subjective   Rosemary Tellez is a 34 y.o. female who I was asked to see in consultation for evaluation of thyrotoxicosis.      The patient states that she was at [place] in January 2025 when she woke up and noticed a lump to her left neck.  She did seek medical attention there and was found to have strep.  She was prescribed a steroid which she started on 1/28 and took for five days.  She was also prescribed an antibiotic.      She has been experiencing:  Headaches daily which are different from migraines; retro orbital in nature   Palpitations    She has been unable to exericse due to dizziness     Symptoms related to thyroid disease are as listed below:  Energy levels - fatigued x 2 weeks   Sleep -  has 11 month old wo does not sleep; not refreshing   Temperature intolerances -  more warm; not wearing winter coat lately   Bowel movements -  regular; 1-2 times per day; previously every 2 days   Hair changes -  increased breakage  Skin changes -  acne   Palpitations - admits x 2 weeks; at rest   Diaphoresis -  denies   Tremors -denies   Dysphagia - resolved  Dyspnea upon lying supine -  admits   Dysphonia -  denies   Weight changes -  denies; prepregnancy weight was 158 lbs     Children are:  6 years  5 years  11 months   She denies having any thyroid abnormalities postpartum   She breast fed each child for 9 months     Has mirena IUD but had spotting in January.      Family history:  Matenral aunt - thryoid cancer; 30s  Maternal aunt - hypothryoidism     She was prescribed Propranolol by her PCP but has not yet started it.      Review of Systems   Constitutional:  Positive for fatigue.   Eyes:  Positive for visual disturbance.   Cardiovascular:  Positive for palpitations.   Neurological:  Positive for dizziness.   All other systems reviewed and are negative.      Objective   /68   Pulse 65   Ht 1.74 m (5' 8.5\")   Wt 76.6 kg (168 lb 12.8 oz)   BMI 25.29 kg/m²   Physical Exam  Vitals and nursing note " reviewed.   Constitutional:       General: She is not in acute distress.     Appearance: Normal appearance. She is normal weight.   HENT:      Head: Normocephalic and atraumatic.      Nose: Nose normal.      Mouth/Throat:      Mouth: Mucous membranes are moist.   Eyes:      Extraocular Movements: Extraocular movements intact.   Neck:      Thyroid: Thyroid mass (Left lobe; 3cm) present. No thyroid tenderness.   Cardiovascular:      Rate and Rhythm: Normal rate and regular rhythm.   Pulmonary:      Effort: Pulmonary effort is normal.      Breath sounds: Normal breath sounds.   Musculoskeletal:         General: Normal range of motion.   Skin:     General: Skin is warm.   Neurological:      Mental Status: She is alert and oriented to person, place, and time.      Comments: No tremors to outstretched hands     Psychiatric:         Mood and Affect: Mood normal.          Lab Results   Component Value Date    TSH 0.02 (L) 02/11/2025    FREET4 2.0 (H) 02/11/2025     Imaging  === 02/06/25 ===    US NECK    - Impression -  Mild thyromegaly with a 3.2 cm TR 2 nodule left thyroid lobe. FNA is not advised based on ACR recommendations.      Assessment/Plan   34 year old female presents for the evaluation and further management of thyrotoxicosis and a large left thyroid nodule.      Thyrotoxicosis without thyroid storm  To obtain blood tests today  To obtain a thyroid uptake and scan   Counseled regarding the etiology of thyrotoxicosis - autoimmune vs. Nodular disease vs. Postpartum thyroiditis       Thyroid nodule  Counseled that this can be an autonomous nodule  Counseled that treatment options will depend on the result of the thyroid uptake and scan  Counseled regarding the broad treatment modalities including medication, radioactive iodine or total thyroidectomy      Will follow up with results

## 2025-02-17 NOTE — PATIENT INSTRUCTIONS
Thank you for choosing [facility]  for your health care needs.  If you have any questions, concerns or medical needs, please feel free to contact our office at [phone].    Please ensure you complete your blood work one week before the next scheduled appointment.    To obtain blood tests today  To obtain a thyroid uptake and scan   Will follow up with results

## 2025-02-18 ENCOUNTER — OFFICE VISIT (OUTPATIENT)
Dept: ENDOCRINOLOGY | Facility: CLINIC | Age: 35
End: 2025-02-18
Payer: COMMERCIAL

## 2025-02-18 VITALS
HEIGHT: 69 IN | DIASTOLIC BLOOD PRESSURE: 68 MMHG | WEIGHT: 168.8 LBS | HEART RATE: 65 BPM | SYSTOLIC BLOOD PRESSURE: 112 MMHG | BODY MASS INDEX: 25 KG/M2

## 2025-02-18 DIAGNOSIS — E05.90 THYROTOXICOSIS WITHOUT THYROID STORM, UNSPECIFIED THYROTOXICOSIS TYPE: Primary | ICD-10-CM

## 2025-02-18 DIAGNOSIS — E05.90 HYPERTHYROIDISM: ICD-10-CM

## 2025-02-18 DIAGNOSIS — E04.1 THYROID NODULE: ICD-10-CM

## 2025-02-18 PROCEDURE — 99204 OFFICE O/P NEW MOD 45 MIN: CPT | Performed by: INTERNAL MEDICINE

## 2025-02-18 PROCEDURE — 3008F BODY MASS INDEX DOCD: CPT | Performed by: INTERNAL MEDICINE

## 2025-02-18 ASSESSMENT — ENCOUNTER SYMPTOMS
PALPITATIONS: 1
DIZZINESS: 1
FATIGUE: 1

## 2025-02-18 NOTE — LETTER
February 19, 2025     Salma Khan DO  [address]  [facility], [address]  [facility] [address]    Patient: Rosemary Tellez   YOB: 1990   Date of Visit: 2/18/2025       Dear Dr. Salma Khan DO:    Thank you for referring Rosemary Tellez to me for evaluation. Below are my notes for this consultation.  If you have questions, please do not hesitate to call me. I look forward to following your patient along with you.       Sincerely,     Wilfrid Lin MD      CC: No Recipients  ______________________________________________________________________________________    Subjective  Rosemary Tellez is a 34 y.o. female who I was asked to see in consultation for evaluation of thyrotoxicosis.      The patient states that she was at [place] in January 2025 when she woke up and noticed a lump to her left neck.  She did seek medical attention there and was found to have strep.  She was prescribed a steroid which she started on 1/28 and took for five days.  She was also prescribed an antibiotic.      She has been experiencing:  Headaches daily which are different from migraines; retro orbital in nature   Palpitations    She has been unable to exericse due to dizziness     Symptoms related to thyroid disease are as listed below:  Energy levels - fatigued x 2 weeks   Sleep -  has 11 month old wo does not sleep; not refreshing   Temperature intolerances -  more warm; not wearing winter coat lately   Bowel movements -  regular; 1-2 times per day; previously every 2 days   Hair changes -  increased breakage  Skin changes -  acne   Palpitations - admits x 2 weeks; at rest   Diaphoresis -  denies   Tremors -denies   Dysphagia - resolved  Dyspnea upon lying supine -  admits   Dysphonia -  denies   Weight changes -  denies; prepregnancy weight was 158 lbs     Children are:  6 years  5 years  11 months   She denies having any thyroid abnormalities postpartum   She breast fed each child for 9  "months     Has mirena IUD but had spotting in January.      Family history:  Matenral aunt - thryoid cancer; 30s  Maternal aunt - hypothryoidism     She was prescribed Propranolol by her PCP but has not yet started it.      Review of Systems   Constitutional:  Positive for fatigue.   Eyes:  Positive for visual disturbance.   Cardiovascular:  Positive for palpitations.   Neurological:  Positive for dizziness.   All other systems reviewed and are negative.      Objective  /68   Pulse 65   Ht 1.74 m (5' 8.5\")   Wt 76.6 kg (168 lb 12.8 oz)   BMI 25.29 kg/m²   Physical Exam  Vitals and nursing note reviewed.   Constitutional:       General: She is not in acute distress.     Appearance: Normal appearance. She is normal weight.   HENT:      Head: Normocephalic and atraumatic.      Nose: Nose normal.      Mouth/Throat:      Mouth: Mucous membranes are moist.   Eyes:      Extraocular Movements: Extraocular movements intact.   Neck:      Thyroid: Thyroid mass (Left lobe; 3cm) present. No thyroid tenderness.   Cardiovascular:      Rate and Rhythm: Normal rate and regular rhythm.   Pulmonary:      Effort: Pulmonary effort is normal.      Breath sounds: Normal breath sounds.   Musculoskeletal:         General: Normal range of motion.   Skin:     General: Skin is warm.   Neurological:      Mental Status: She is alert and oriented to person, place, and time.      Comments: No tremors to outstretched hands     Psychiatric:         Mood and Affect: Mood normal.          Lab Results   Component Value Date    TSH 0.02 (L) 02/11/2025    FREET4 2.0 (H) 02/11/2025     Imaging  === 02/06/25 ===    US NECK    - Impression -  Mild thyromegaly with a 3.2 cm TR 2 nodule left thyroid lobe. FNA is not advised based on ACR recommendations.      Assessment/Plan  34 year old female presents for the evaluation and further management of thyrotoxicosis and a large left thyroid nodule.      Thyrotoxicosis without thyroid storm  To obtain " blood tests today  To obtain a thyroid uptake and scan   Counseled regarding the etiology of thyrotoxicosis - autoimmune vs. Nodular disease vs. Postpartum thyroiditis       Thyroid nodule  Counseled that this can be an autonomous nodule  Counseled that treatment options will depend on the result of the thyroid uptake and scan  Counseled regarding the broad treatment modalities including medication, radioactive iodine or total thyroidectomy      Will follow up with results

## 2025-02-19 PROBLEM — E04.1 THYROID NODULE: Status: ACTIVE | Noted: 2025-02-19

## 2025-02-19 PROBLEM — E05.90 THYROTOXICOSIS WITHOUT THYROID STORM: Status: ACTIVE | Noted: 2025-02-19

## 2025-02-19 NOTE — ASSESSMENT & PLAN NOTE
To obtain blood tests today  To obtain a thyroid uptake and scan   Counseled regarding the etiology of thyrotoxicosis - autoimmune vs. Nodular disease vs. Postpartum thyroiditis

## 2025-02-19 NOTE — ASSESSMENT & PLAN NOTE
Counseled that this can be an autonomous nodule  Counseled that treatment options will depend on the result of the thyroid uptake and scan  Counseled regarding the broad treatment modalities including medication, radioactive iodine or total thyroidectomy      Will follow up with results

## 2025-02-20 ENCOUNTER — HOSPITAL ENCOUNTER (OUTPATIENT)
Dept: RADIOLOGY | Facility: HOSPITAL | Age: 35
Discharge: HOME | End: 2025-02-20
Payer: COMMERCIAL

## 2025-02-20 DIAGNOSIS — E05.90 THYROTOXICOSIS WITHOUT THYROID STORM, UNSPECIFIED THYROTOXICOSIS TYPE: ICD-10-CM

## 2025-02-20 PROCEDURE — 3430000001 HC RX 343 DIAGNOSTIC RADIOPHARMACEUTICALS: Performed by: RADIOLOGY

## 2025-02-20 PROCEDURE — 78014 THYROID IMAGING W/BLOOD FLOW: CPT

## 2025-02-20 PROCEDURE — A9516 IODINE I-123 SOD IODIDE MIC: HCPCS | Performed by: RADIOLOGY

## 2025-02-20 RX ORDER — SODIUM IODIDE I 123 200 UCI/1
400 CAPSULE, GELATIN COATED ORAL
Status: COMPLETED | OUTPATIENT
Start: 2025-02-20 | End: 2025-02-20

## 2025-02-20 RX ADMIN — SODIUM IODIDE I 123 400 MICROCURIE: 200 CAPSULE, GELATIN COATED ORAL at 08:10

## 2025-02-21 ENCOUNTER — HOSPITAL ENCOUNTER (OUTPATIENT)
Dept: RADIOLOGY | Facility: HOSPITAL | Age: 35
Discharge: HOME | End: 2025-02-21
Payer: COMMERCIAL

## 2025-02-21 LAB
ALBUMIN SERPL-MCNC: 4.8 G/DL (ref 3.6–5.1)
ALBUMIN/GLOB SERPL: 1.8 (CALC) (ref 1–2.5)
ALP SERPL-CCNC: 40 U/L (ref 31–125)
ALT SERPL-CCNC: 17 U/L (ref 6–29)
AST SERPL-CCNC: 18 U/L (ref 10–30)
BILIRUB DIRECT SERPL-MCNC: 0.2 MG/DL
BILIRUB INDIRECT SERPL-MCNC: 0.8 MG/DL (CALC) (ref 0.2–1.2)
BILIRUB SERPL-MCNC: 1 MG/DL (ref 0.2–1.2)
ERYTHROCYTE [DISTWIDTH] IN BLOOD BY AUTOMATED COUNT: 11.7 % (ref 11–15)
GLOBULIN SER CALC-MCNC: 2.6 G/DL (CALC) (ref 1.9–3.7)
HCT VFR BLD AUTO: 43.7 % (ref 35–45)
HGB BLD-MCNC: 14.2 G/DL (ref 11.7–15.5)
MCH RBC QN AUTO: 29.6 PG (ref 27–33)
MCHC RBC AUTO-ENTMCNC: 32.5 G/DL (ref 32–36)
MCV RBC AUTO: 91 FL (ref 80–100)
PLATELET # BLD AUTO: 234 THOUSAND/UL (ref 140–400)
PMV BLD REES-ECKER: 11.6 FL (ref 7.5–12.5)
PROT SERPL-MCNC: 7.4 G/DL (ref 6.1–8.1)
RBC # BLD AUTO: 4.8 MILLION/UL (ref 3.8–5.1)
T3 SERPL-MCNC: 89 NG/DL (ref 76–181)
T4 FREE SERPL-MCNC: 1.2 NG/DL (ref 0.8–1.8)
TSH BII SER-ACNC: <1 IU/L
TSH SERPL-ACNC: 0.03 MIU/L
TSI SER-ACNC: <89 % BASELINE
WBC # BLD AUTO: 7.2 THOUSAND/UL (ref 3.8–10.8)

## 2025-02-24 DIAGNOSIS — E05.90 THYROTOXICOSIS WITHOUT THYROID STORM, UNSPECIFIED THYROTOXICOSIS TYPE: Primary | ICD-10-CM

## 2025-04-07 DIAGNOSIS — E05.90 THYROTOXICOSIS WITHOUT THYROID STORM, UNSPECIFIED THYROTOXICOSIS TYPE: ICD-10-CM

## 2025-05-20 LAB — TSH SERPL-ACNC: 0.87 MIU/L

## 2025-06-01 DIAGNOSIS — E06.9 THYROIDITIS: Primary | ICD-10-CM

## 2025-06-30 ENCOUNTER — APPOINTMENT (OUTPATIENT)
Dept: ENDOCRINOLOGY | Facility: CLINIC | Age: 35
End: 2025-06-30
Payer: COMMERCIAL

## 2025-07-11 ENCOUNTER — APPOINTMENT (OUTPATIENT)
Dept: PRIMARY CARE | Facility: CLINIC | Age: 35
End: 2025-07-11
Payer: COMMERCIAL

## 2025-07-13 DIAGNOSIS — E06.9 THYROIDITIS: ICD-10-CM

## 2025-07-29 ASSESSMENT — ENCOUNTER SYMPTOMS
NAUSEA: 0
DYSPHORIC MOOD: 0
CHILLS: 0
WHEEZING: 0
DYSURIA: 0
CONSTIPATION: 0
COUGH: 0
HEADACHES: 0
NERVOUS/ANXIOUS: 0
HEMATURIA: 0
SHORTNESS OF BREATH: 0
DIARRHEA: 0
PALPITATIONS: 0
FATIGUE: 0
NUMBNESS: 0
ABDOMINAL PAIN: 0
DIZZINESS: 0
FREQUENCY: 0
FEVER: 0

## 2025-07-30 ENCOUNTER — APPOINTMENT (OUTPATIENT)
Dept: PRIMARY CARE | Facility: CLINIC | Age: 35
End: 2025-07-30
Payer: COMMERCIAL

## 2025-07-30 VITALS
HEART RATE: 68 BPM | DIASTOLIC BLOOD PRESSURE: 72 MMHG | WEIGHT: 169 LBS | OXYGEN SATURATION: 99 % | BODY MASS INDEX: 25.03 KG/M2 | HEIGHT: 69 IN | SYSTOLIC BLOOD PRESSURE: 110 MMHG | TEMPERATURE: 97.3 F

## 2025-07-30 DIAGNOSIS — Z13.31 DEPRESSION SCREENING: ICD-10-CM

## 2025-07-30 DIAGNOSIS — Z13.6 ENCOUNTER FOR SCREENING FOR CORONARY ARTERY DISEASE: ICD-10-CM

## 2025-07-30 DIAGNOSIS — Z00.00 HEALTH MAINTENANCE EXAMINATION: Primary | ICD-10-CM

## 2025-07-30 PROCEDURE — 96127 BRIEF EMOTIONAL/BEHAV ASSMT: CPT | Performed by: STUDENT IN AN ORGANIZED HEALTH CARE EDUCATION/TRAINING PROGRAM

## 2025-07-30 PROCEDURE — 1036F TOBACCO NON-USER: CPT | Performed by: STUDENT IN AN ORGANIZED HEALTH CARE EDUCATION/TRAINING PROGRAM

## 2025-07-30 PROCEDURE — 99395 PREV VISIT EST AGE 18-39: CPT | Performed by: STUDENT IN AN ORGANIZED HEALTH CARE EDUCATION/TRAINING PROGRAM

## 2025-07-30 PROCEDURE — 3008F BODY MASS INDEX DOCD: CPT | Performed by: STUDENT IN AN ORGANIZED HEALTH CARE EDUCATION/TRAINING PROGRAM

## 2025-07-30 ASSESSMENT — ANXIETY QUESTIONNAIRES
6. BECOMING EASILY ANNOYED OR IRRITABLE: SEVERAL DAYS
5. BEING SO RESTLESS THAT IT IS HARD TO SIT STILL: NOT AT ALL
GAD7 TOTAL SCORE: 2
1. FEELING NERVOUS, ANXIOUS, OR ON EDGE: NOT AT ALL
7. FEELING AFRAID AS IF SOMETHING AWFUL MIGHT HAPPEN: SEVERAL DAYS
3. WORRYING TOO MUCH ABOUT DIFFERENT THINGS: NOT AT ALL
2. NOT BEING ABLE TO STOP OR CONTROL WORRYING: NOT AT ALL
4. TROUBLE RELAXING: NOT AT ALL

## 2025-07-30 ASSESSMENT — ENCOUNTER SYMPTOMS
OCCASIONAL FEELINGS OF UNSTEADINESS: 0
DEPRESSION: 0

## 2025-07-30 ASSESSMENT — PATIENT HEALTH QUESTIONNAIRE - PHQ9
SUM OF ALL RESPONSES TO PHQ9 QUESTIONS 1 AND 2: 0
1. LITTLE INTEREST OR PLEASURE IN DOING THINGS: NOT AT ALL
2. FEELING DOWN, DEPRESSED OR HOPELESS: NOT AT ALL

## 2025-07-30 NOTE — PROGRESS NOTES
"Rosemary Tellez  presents for her annual wellness exam.    HPI  Specialists seen by patient: GYN  -dentist  -eye doctor  -derm  -breast specialist  - Endocrinology: Thyroiditis    Last pap/cervical cancer screenin  Last mammogram: alternates mammo and MRI per breast specialist  Hx of colon ca screening: n/a  Hx of DXA: n/a  Immunizations: up to date  Diet: Follows a healthy diet  Exercise: Gets regular exercise, exercise bootcamp and walking   Alcohol abuse screen:   social   How many times in the past year 4 or more drinks in a day? 2  Lung cancer screening:   Smoking history: never a smoker  Drug use: No  PHQ-9: 0  HCPOA: No    Has a spot on her left great toe.  Thinks that she may have dropped something on it.    Has been getting swelling in her hands in the morning.  Has tried to increase her fluids.    Review of Systems   Constitutional:  Negative for chills, fatigue and fever.   Respiratory:  Negative for cough, shortness of breath and wheezing.    Cardiovascular:  Negative for chest pain, palpitations and leg swelling.   Gastrointestinal:  Negative for abdominal pain, constipation, diarrhea and nausea.   Genitourinary:  Negative for dysuria, frequency, hematuria and urgency.   Neurological:  Negative for dizziness, numbness and headaches.   Psychiatric/Behavioral:  Negative for dysphoric mood. The patient is not nervous/anxious.           Objective    /72 (BP Location: Left arm, Patient Position: Sitting, BP Cuff Size: Adult)   Pulse 68   Temp 36.3 °C (97.3 °F) (Temporal)   Ht 1.755 m (5' 9.09\")   Wt 76.7 kg (169 lb)   SpO2 99%   BMI 24.89 kg/m²     Physical Exam  Constitutional:       General: She is not in acute distress.     Appearance: Normal appearance.   HENT:      Head: Normocephalic and atraumatic.      Right Ear: Tympanic membrane and ear canal normal.      Left Ear: Tympanic membrane and ear canal normal.      Mouth/Throat:      Mouth: Mucous membranes are moist.      Pharynx: " No posterior oropharyngeal erythema.     Eyes:      Extraocular Movements: Extraocular movements intact.      Pupils: Pupils are equal, round, and reactive to light.       Cardiovascular:      Rate and Rhythm: Normal rate and regular rhythm.      Heart sounds: No murmur heard.  Pulmonary:      Effort: Pulmonary effort is normal. No respiratory distress.      Breath sounds: Normal breath sounds. No wheezing.   Abdominal:      General: Bowel sounds are normal.      Palpations: Abdomen is soft.      Tenderness: There is no abdominal tenderness. There is no guarding.     Musculoskeletal:         General: Normal range of motion.      Cervical back: Neck supple.   Feet:      Comments: Approximately 2 mm, round, brown lesion under left great toenail    Skin:     General: Skin is warm and dry.     Neurological:      General: No focal deficit present.      Mental Status: She is alert and oriented to person, place, and time.     Psychiatric:         Mood and Affect: Mood normal.         Behavior: Behavior normal.            Problem List Items Addressed This Visit    None  Visit Diagnoses         Health maintenance examination    -  Primary    Relevant Orders    Lipid Panel    Basic Metabolic Panel      Encounter for screening for coronary artery disease        Relevant Orders    Lipid Panel      Depression screening                 We will obtain fasting blood work.  Results will be communicated to the patient via MyChart, phone call, or a letter.   We reviewed appropriate preventative health screening guidelines.  We discussed regular aerobic exercise. We discussed proper nutrition and weight control.    Recommended trying to increase her fluid intake and decrease her salt intake    Recommended keeping an eye on the spot on her toe and if not improving or going away, recommended talking to her dermatologist    5-10 minutes were spent screening for depression using PHQ-2/PHQ-9 as documented in the chart      Salma EL  DO [facility]  07/30/25

## 2025-08-04 LAB
ANION GAP SERPL CALCULATED.4IONS-SCNC: 6 MMOL/L (CALC) (ref 7–17)
BASOPHILS # BLD AUTO: 28 CELLS/UL (ref 0–200)
BASOPHILS NFR BLD AUTO: 0.7 %
BUN SERPL-MCNC: 11 MG/DL (ref 7–25)
BUN/CREAT SERPL: ABNORMAL (CALC) (ref 6–22)
CALCIUM SERPL-MCNC: 9.1 MG/DL (ref 8.6–10.2)
CHLORIDE SERPL-SCNC: 106 MMOL/L (ref 98–110)
CHOLEST SERPL-MCNC: 152 MG/DL
CHOLEST/HDLC SERPL: 2.1 (CALC)
CO2 SERPL-SCNC: 27 MMOL/L (ref 20–32)
CREAT SERPL-MCNC: 0.67 MG/DL (ref 0.5–0.97)
EGFRCR SERPLBLD CKD-EPI 2021: 115 ML/MIN/1.73M2
EOSINOPHIL # BLD AUTO: 68 CELLS/UL (ref 15–500)
EOSINOPHIL NFR BLD AUTO: 1.7 %
ERYTHROCYTE [DISTWIDTH] IN BLOOD BY AUTOMATED COUNT: 11.7 % (ref 11–15)
GLUCOSE SERPL-MCNC: 82 MG/DL (ref 65–99)
HCT VFR BLD AUTO: 41 % (ref 35–45)
HDLC SERPL-MCNC: 74 MG/DL
HGB BLD-MCNC: 13.5 G/DL (ref 11.7–15.5)
LDLC SERPL CALC-MCNC: 64 MG/DL (CALC)
LYMPHOCYTES # BLD AUTO: 1100 CELLS/UL (ref 850–3900)
LYMPHOCYTES NFR BLD AUTO: 27.5 %
MCH RBC QN AUTO: 29.9 PG (ref 27–33)
MCHC RBC AUTO-ENTMCNC: 32.9 G/DL (ref 32–36)
MCV RBC AUTO: 90.7 FL (ref 80–100)
MONOCYTES # BLD AUTO: 504 CELLS/UL (ref 200–950)
MONOCYTES NFR BLD AUTO: 12.6 %
NEUTROPHILS # BLD AUTO: 2300 CELLS/UL (ref 1500–7800)
NEUTROPHILS NFR BLD AUTO: 57.5 %
NONHDLC SERPL-MCNC: 78 MG/DL (CALC)
PLATELET # BLD AUTO: 191 THOUSAND/UL (ref 140–400)
PMV BLD REES-ECKER: 10.6 FL (ref 7.5–12.5)
POTASSIUM SERPL-SCNC: 4.6 MMOL/L (ref 3.5–5.3)
RBC # BLD AUTO: 4.52 MILLION/UL (ref 3.8–5.1)
SODIUM SERPL-SCNC: 139 MMOL/L (ref 135–146)
TRIGL SERPL-MCNC: 59 MG/DL
TSH SERPL-ACNC: 1.06 MIU/L
WBC # BLD AUTO: 4 THOUSAND/UL (ref 3.8–10.8)

## 2025-09-30 ENCOUNTER — APPOINTMENT (OUTPATIENT)
Dept: OBSTETRICS AND GYNECOLOGY | Facility: CLINIC | Age: 35
End: 2025-09-30
Payer: COMMERCIAL

## 2026-07-10 ENCOUNTER — APPOINTMENT (OUTPATIENT)
Dept: PRIMARY CARE | Facility: CLINIC | Age: 36
End: 2026-07-10
Payer: COMMERCIAL